# Patient Record
Sex: FEMALE | Race: BLACK OR AFRICAN AMERICAN | NOT HISPANIC OR LATINO | URBAN - METROPOLITAN AREA
[De-identification: names, ages, dates, MRNs, and addresses within clinical notes are randomized per-mention and may not be internally consistent; named-entity substitution may affect disease eponyms.]

---

## 2023-06-13 PROBLEM — E55.9 VITAMIN D DEFICIENCY: Status: ACTIVE | Noted: 2023-06-13

## 2023-06-13 PROBLEM — I10 HYPERTENSION: Status: ACTIVE | Noted: 2023-06-13

## 2023-06-13 PROBLEM — M15.9 GENERALIZED OSTEOARTHRITIS OF MULTIPLE SITES: Status: ACTIVE | Noted: 2023-06-13

## 2023-06-14 ENCOUNTER — OFFICE VISIT (OUTPATIENT)
Dept: PRIMARY CARE | Facility: CLINIC | Age: 63
End: 2023-06-14
Payer: COMMERCIAL

## 2023-06-14 VITALS
BODY MASS INDEX: 26.66 KG/M2 | WEIGHT: 136.5 LBS | HEART RATE: 92 BPM | DIASTOLIC BLOOD PRESSURE: 77 MMHG | SYSTOLIC BLOOD PRESSURE: 185 MMHG | TEMPERATURE: 98.3 F

## 2023-06-14 DIAGNOSIS — M25.562 CHRONIC PAIN OF LEFT KNEE: ICD-10-CM

## 2023-06-14 DIAGNOSIS — G43.709 CHRONIC MIGRAINE WITHOUT AURA WITHOUT STATUS MIGRAINOSUS, NOT INTRACTABLE: ICD-10-CM

## 2023-06-14 DIAGNOSIS — M25.512 CHRONIC LEFT SHOULDER PAIN: ICD-10-CM

## 2023-06-14 DIAGNOSIS — Z00.00 HEALTHCARE MAINTENANCE: ICD-10-CM

## 2023-06-14 DIAGNOSIS — E78.5 HYPERLIPIDEMIA, UNSPECIFIED HYPERLIPIDEMIA TYPE: ICD-10-CM

## 2023-06-14 DIAGNOSIS — I10 HYPERTENSION, UNSPECIFIED TYPE: ICD-10-CM

## 2023-06-14 DIAGNOSIS — Z12.39 BREAST SCREENING: Primary | ICD-10-CM

## 2023-06-14 DIAGNOSIS — M15.9 GENERALIZED OSTEOARTHRITIS OF MULTIPLE SITES: ICD-10-CM

## 2023-06-14 DIAGNOSIS — G89.29 CHRONIC PAIN OF LEFT KNEE: ICD-10-CM

## 2023-06-14 DIAGNOSIS — G89.29 CHRONIC LEFT SHOULDER PAIN: ICD-10-CM

## 2023-06-14 DIAGNOSIS — Z98.84 HISTORY OF ROUX-EN-Y GASTRIC BYPASS: ICD-10-CM

## 2023-06-14 PROCEDURE — 1036F TOBACCO NON-USER: CPT | Performed by: INTERNAL MEDICINE

## 2023-06-14 PROCEDURE — 3078F DIAST BP <80 MM HG: CPT | Performed by: INTERNAL MEDICINE

## 2023-06-14 PROCEDURE — 99215 OFFICE O/P EST HI 40 MIN: CPT | Performed by: INTERNAL MEDICINE

## 2023-06-14 PROCEDURE — 3077F SYST BP >= 140 MM HG: CPT | Performed by: INTERNAL MEDICINE

## 2023-06-14 RX ORDER — ATORVASTATIN CALCIUM 20 MG/1
1 TABLET, FILM COATED ORAL DAILY
COMMUNITY
Start: 2014-04-04 | End: 2023-06-14 | Stop reason: SDUPTHER

## 2023-06-14 RX ORDER — MECLIZINE HYDROCHLORIDE 25 MG/1
1 TABLET ORAL 3 TIMES DAILY PRN
COMMUNITY
Start: 2017-06-30 | End: 2023-06-14 | Stop reason: WASHOUT

## 2023-06-14 RX ORDER — ONDANSETRON 4 MG/1
TABLET, FILM COATED ORAL
COMMUNITY
Start: 2021-07-21 | End: 2023-06-14 | Stop reason: WASHOUT

## 2023-06-14 RX ORDER — BUTALBITAL, ACETAMINOPHEN AND CAFFEINE 50; 325; 40 MG/1; MG/1; MG/1
TABLET ORAL
COMMUNITY
Start: 2022-02-10 | End: 2023-06-14 | Stop reason: WASHOUT

## 2023-06-14 RX ORDER — ELETRIPTAN HYDROBROMIDE 40 MG/1
TABLET, FILM COATED ORAL
COMMUNITY
Start: 2014-12-17 | End: 2023-06-14 | Stop reason: WASHOUT

## 2023-06-14 RX ORDER — METOPROLOL TARTRATE 50 MG/1
1 TABLET ORAL 2 TIMES DAILY
COMMUNITY
Start: 2021-07-29 | End: 2023-06-14 | Stop reason: ALTCHOICE

## 2023-06-14 RX ORDER — DULOXETIN HYDROCHLORIDE 30 MG/1
1 CAPSULE, DELAYED RELEASE ORAL DAILY
COMMUNITY
Start: 2014-12-17 | End: 2023-06-14 | Stop reason: ALTCHOICE

## 2023-06-14 RX ORDER — MOMETASONE FUROATE 50 UG/1
SPRAY, METERED NASAL
COMMUNITY
Start: 2014-04-04

## 2023-06-14 RX ORDER — ATORVASTATIN CALCIUM 20 MG/1
20 TABLET, FILM COATED ORAL DAILY
Qty: 90 TABLET | Refills: 0 | Status: SHIPPED | OUTPATIENT
Start: 2023-06-14

## 2023-06-14 RX ORDER — BUTALBITAL, ACETAMINOPHEN AND CAFFEINE 50; 325; 40 MG/1; MG/1; MG/1
1 TABLET ORAL EVERY 4 HOURS PRN
Qty: 30 TABLET | Refills: 0 | Status: SHIPPED | OUTPATIENT
Start: 2023-06-14

## 2023-06-14 RX ORDER — KETOCONAZOLE 20 MG/G
CREAM TOPICAL 2 TIMES DAILY
COMMUNITY
Start: 2018-01-30 | End: 2023-06-14 | Stop reason: WASHOUT

## 2023-06-14 NOTE — ASSESSMENT & PLAN NOTE
Blood pressure elevated today reports within normal limits at home. Advised home blood pressure monitoring, will further risk stratify, consider EKG at next visit.

## 2023-06-14 NOTE — PATIENT INSTRUCTIONS
It was a pleasure to see you today! Here is a list of things we have discussed and to follow up on:   Gynecology referral - I recommend Dr. Rosibel Ward ([phone]), or Dr. Joslyn Contreras ([phone]).   Mammogram - Call [phone] or stop by the 4th floor ([address]) at the breast center to have this scheduled.   I have ordered blood and/or urine tests for you to do today. The lab can be found on this floor (2nd floor) next to the pharmacy across from the elevators.   I recommend you monitor your home blood pressure readings. To do this, be sure that the blood pressure cuff is on bare skin. Feet should be planted on the floor and back should be supported. Arm should be resting at the level of the heart. No talking to anyone during measurement and no caffeine within 30 minutes of checking blood pressure. Goal should be <130/80 on AVERAGE (outliers do not count).   Referrals to physical therapy for your knee and shoulder.   Followup 3 months with labs prior for your physical

## 2023-06-14 NOTE — PROGRESS NOTES
Subjective   Patient ID: Juan Miguel Cristobal is a 63 y.o. female who presents for NEW PT VISIT .  HPI  53-year-old female former patient of Dr. Johnson here for establishment of care, last seen 8/21     - A few months ago she fell 4/7 and hit her headache with subsequent severe headache that eventually resolved over the course of a month. She was at a show and someone fell into her and she fell backwards. then fell again with a headache 3 weeks later after she sat in a chair with a forgotten broken leg at the end of April and again hit her head with another headache that eventually subsided. She did not seek medical attention at the time. Denies LOC, seizure like activity, no incontinence, no changes in vision or hearing, no numbness or tingling, no weakness, no recurrence. Has not fallen since then.     PMHx:   -Hypertension- has been noting lately that her blood pressure readings were in the 140s-150s/80s at work at home is in the 110s/70s. Has common WCH. On no medications, previously treated prior to surgery. Previously on losartan and metoprolol.   - Fibromyalgia previously on duloxetine 30mg discontinued post surgery now resolved.   -Migraine headaches -consideration for Nurtec no aura, has not had any recently. She takes butalbital had intolerance to triptan   -Obesity status with  Ciro-en-Y 7/21/21   - Vitamin D deficiency   - OA in left knee and left shoulder - seen by orthopedics in the past recommended consideration for surgery recommended PT but never went due to COVID. After weight loss surgery all pain resolved in her knee until she ate something that has preservatives. Still with pain in left shoulder but also improved. Interested in PT to help with appropriate posture.   - HLD previously on atorvastatin 20mg then cut back to 10mg   - Partial thyroidectomy     Family: father's side lots of heart disease, diabetes, strokes     Social:   - Lives at home  and 25 year old son also with mother with  dementia, also daughter living with them   - Never tobacco, social alcohol   - Works as a pharmacist at [employer]   Current Outpatient Medications   Medication Instructions    atorvastatin (LIPITOR) 20 mg, oral, Daily    butalbital-acetaminophen-caff -40 mg tablet 1 tablet, oral, Every 4 hours PRN    mometasone (Nasonex) 50 mcg/actuation nasal spray nasal        Objective     BP (!) 185/77   Pulse 92   Temp 36.8 °C (98.3 °F)   Wt 61.9 kg (136 lb 8 oz)   BMI 26.66 kg/m²     Physical Exam  General: Appears comfortable, NAD, appropriate affect  HEENT: NCAT, EOMI, pupils symmetric, no conjunctival erythema   Neck: Supple, no LAD   Heart: RRR S1 S2 no murmurs appreciated   Lungs: CTA bilaterally, no rhonchi, rales, or wheezes   Abdomen: Soft, NT/ND, no rebound or guarding, NABS   Extremities: no cyanosis or edema appreciated, Knee Full ROM, no effusion or erythema appreciated, no joint line tenderness, normal patellar motion, negative anterior and posterior drawer, negative varus and valgus stress testing. Negative Lilia's sign.   Neuro: AAO x 3, answers questions appropriately, no FND, gait unremarkable    Assessment/Plan   Problem List Items Addressed This Visit       Hypertension     Blood pressure elevated today reports within normal limits at home. Advised home blood pressure monitoring, will further risk stratify, consider EKG at next visit.         Relevant Orders    Urinalysis with Reflex Microscopic    Generalized osteoarthritis of multiple sites     With chronic pain in left knee and shoulder. No alarm features appreciated on examination, will refer to PT.          History of Ciro-en-Y gastric bypass     With significant success at weight loss and significant improvement in chronic sequelae including pain. Has had nutritional deficiency in the past, currently taking a bariatric vitamin. Will screen for all nutritional deficiencies.         Relevant Orders    Ferritin    Iron and TIBC    Vitamin B12     Folate    Selenium, Blood    Vitamin A    Vitamin E    Vitamin K    Vitamin B1, Whole Blood    Copper, serum    Zinc    Chronic migraine without aura without status migrainosus, not intractable     Longstanding with improvement in symptoms on butalbital, not interested in adjustment or switching to alternate medication at present, not taking anything else. Limited refill provided.          Relevant Medications    butalbital-acetaminophen-caff -40 mg tablet    Hyperlipidemia     On atorvastatin 20mg due for rechecking lipid profile          Relevant Medications    atorvastatin (Lipitor) 20 mg tablet     Other Visit Diagnoses       Breast screening    -  Primary    Relevant Orders    BI mammo bilateral screening tomosynthesis    Chronic left shoulder pain        Relevant Orders    Referral to Physical Therapy    Healthcare maintenance        Relevant Orders    CBC and Auto Differential    Comprehensive Metabolic Panel    Hemoglobin A1C    Lipid Panel    TSH with reflex to Free T4 if abnormal    Vitamin D, Total    Hepatitis C Antibody    Follow Up In [facility] - PCP    Chronic pain of left knee        Relevant Orders    Referral to Physical Therapy          Health Maintenance  Cancer Screening:  - Colonoscopy 2021 at [facility]   - Mammogram due and ordered   - Pap due   - Skin   Immunizations: to discuss at next visit     Followup in 3 months with labs prior for preventive care visit.

## 2023-06-15 NOTE — ASSESSMENT & PLAN NOTE
Longstanding with improvement in symptoms on butalbital, not interested in adjustment or switching to alternate medication at present, not taking anything else. Limited refill provided.

## 2023-06-15 NOTE — ASSESSMENT & PLAN NOTE
With chronic pain in left knee and shoulder. No alarm features appreciated on examination, will refer to PT.

## 2023-06-15 NOTE — ASSESSMENT & PLAN NOTE
With significant success at weight loss and significant improvement in chronic sequelae including pain. Has had nutritional deficiency in the past, currently taking a bariatric vitamin. Will screen for all nutritional deficiencies.

## 2023-06-19 DIAGNOSIS — R92.8 ABNORMALITY OF RIGHT BREAST ON SCREENING MAMMOGRAM: Primary | ICD-10-CM

## 2023-09-20 ENCOUNTER — LAB (OUTPATIENT)
Dept: LAB | Facility: LAB | Age: 63
End: 2023-09-20
Payer: COMMERCIAL

## 2023-09-20 DIAGNOSIS — Z98.84 HISTORY OF ROUX-EN-Y GASTRIC BYPASS: ICD-10-CM

## 2023-09-20 DIAGNOSIS — Z00.00 HEALTHCARE MAINTENANCE: ICD-10-CM

## 2023-09-20 DIAGNOSIS — I10 HYPERTENSION, UNSPECIFIED TYPE: ICD-10-CM

## 2023-09-20 LAB
ALANINE AMINOTRANSFERASE (SGPT) (U/L) IN SER/PLAS: 28 U/L (ref 7–45)
ALBUMIN (G/DL) IN SER/PLAS: 4.8 G/DL (ref 3.4–5)
ALKALINE PHOSPHATASE (U/L) IN SER/PLAS: 45 U/L (ref 33–136)
ANION GAP IN SER/PLAS: 13 MMOL/L (ref 10–20)
APPEARANCE, URINE: NORMAL
ASPARTATE AMINOTRANSFERASE (SGOT) (U/L) IN SER/PLAS: 28 U/L (ref 9–39)
BASOPHILS (10*3/UL) IN BLOOD BY AUTOMATED COUNT: 0.05 X10E9/L (ref 0–0.1)
BASOPHILS/100 LEUKOCYTES IN BLOOD BY AUTOMATED COUNT: 0.7 % (ref 0–2)
BILIRUBIN TOTAL (MG/DL) IN SER/PLAS: 1.2 MG/DL (ref 0–1.2)
BILIRUBIN, URINE: NEGATIVE
BLOOD, URINE: NEGATIVE
CALCIDIOL (25 OH VITAMIN D3) (NG/ML) IN SER/PLAS: 63 NG/ML
CALCIUM (MG/DL) IN SER/PLAS: 10.6 MG/DL (ref 8.6–10.6)
CARBON DIOXIDE, TOTAL (MMOL/L) IN SER/PLAS: 29 MMOL/L (ref 21–32)
CHLORIDE (MMOL/L) IN SER/PLAS: 100 MMOL/L (ref 98–107)
CHOLESTEROL (MG/DL) IN SER/PLAS: 158 MG/DL (ref 0–199)
CHOLESTEROL IN HDL (MG/DL) IN SER/PLAS: 66.6 MG/DL
CHOLESTEROL/HDL RATIO: 2.4
COBALAMIN (VITAMIN B12) (PG/ML) IN SER/PLAS: 1314 PG/ML (ref 211–911)
COLOR, URINE: YELLOW
CREATININE (MG/DL) IN SER/PLAS: 0.92 MG/DL (ref 0.5–1.05)
EOSINOPHILS (10*3/UL) IN BLOOD BY AUTOMATED COUNT: 0.08 X10E9/L (ref 0–0.7)
EOSINOPHILS/100 LEUKOCYTES IN BLOOD BY AUTOMATED COUNT: 1.2 % (ref 0–6)
ERYTHROCYTE DISTRIBUTION WIDTH (RATIO) BY AUTOMATED COUNT: 15.8 % (ref 11.5–14.5)
ERYTHROCYTE MEAN CORPUSCULAR HEMOGLOBIN CONCENTRATION (G/DL) BY AUTOMATED: 30.9 G/DL (ref 32–36)
ERYTHROCYTE MEAN CORPUSCULAR VOLUME (FL) BY AUTOMATED COUNT: 78 FL (ref 80–100)
ERYTHROCYTES (10*6/UL) IN BLOOD BY AUTOMATED COUNT: 5.08 X10E12/L (ref 4–5.2)
ESTIMATED AVERAGE GLUCOSE FOR HBA1C: 97 MG/DL
FERRITIN (UG/LL) IN SER/PLAS: 208 UG/L (ref 8–150)
FOLATE (NG/ML) IN SER/PLAS: >24 NG/ML
GFR FEMALE: 70 ML/MIN/1.73M2
GLUCOSE (MG/DL) IN SER/PLAS: 84 MG/DL (ref 74–99)
GLUCOSE, URINE: NEGATIVE MG/DL
HEMATOCRIT (%) IN BLOOD BY AUTOMATED COUNT: 39.5 % (ref 36–46)
HEMOGLOBIN (G/DL) IN BLOOD: 12.2 G/DL (ref 12–16)
HEMOGLOBIN A1C/HEMOGLOBIN TOTAL IN BLOOD: 5 %
HEPATITIS C VIRUS AB PRESENCE IN SERUM: NONREACTIVE
IMMATURE GRANULOCYTES/100 LEUKOCYTES IN BLOOD BY AUTOMATED COUNT: 0.1 % (ref 0–0.9)
IRON (UG/DL) IN SER/PLAS: 125 UG/DL (ref 35–150)
IRON BINDING CAPACITY (UG/DL) IN SER/PLAS: 358 UG/DL (ref 240–445)
IRON SATURATION (%) IN SER/PLAS: 35 % (ref 25–45)
KETONES, URINE: NEGATIVE MG/DL
LDL: 75 MG/DL (ref 0–99)
LEUKOCYTE ESTERASE, URINE: NEGATIVE
LEUKOCYTES (10*3/UL) IN BLOOD BY AUTOMATED COUNT: 6.8 X10E9/L (ref 4.4–11.3)
LYMPHOCYTES (10*3/UL) IN BLOOD BY AUTOMATED COUNT: 3.92 X10E9/L (ref 1.2–4.8)
LYMPHOCYTES/100 LEUKOCYTES IN BLOOD BY AUTOMATED COUNT: 57.4 % (ref 13–44)
MONOCYTES (10*3/UL) IN BLOOD BY AUTOMATED COUNT: 0.49 X10E9/L (ref 0.1–1)
MONOCYTES/100 LEUKOCYTES IN BLOOD BY AUTOMATED COUNT: 7.2 % (ref 2–10)
NEUTROPHILS (10*3/UL) IN BLOOD BY AUTOMATED COUNT: 2.28 X10E9/L (ref 1.2–7.7)
NEUTROPHILS/100 LEUKOCYTES IN BLOOD BY AUTOMATED COUNT: 33.4 % (ref 40–80)
NITRITE, URINE: NEGATIVE
NRBC (PER 100 WBCS) BY AUTOMATED COUNT: 0 /100 WBC (ref 0–0)
PH, URINE: 8 (ref 5–8)
PLATELETS (10*3/UL) IN BLOOD AUTOMATED COUNT: 200 X10E9/L (ref 150–450)
POTASSIUM (MMOL/L) IN SER/PLAS: 3.8 MMOL/L (ref 3.5–5.3)
PROTEIN TOTAL: 7.3 G/DL (ref 6.4–8.2)
PROTEIN, URINE: NEGATIVE MG/DL
SODIUM (MMOL/L) IN SER/PLAS: 138 MMOL/L (ref 136–145)
SPECIFIC GRAVITY, URINE: 1 (ref 1–1.03)
THYROTROPIN (MIU/L) IN SER/PLAS BY DETECTION LIMIT <= 0.05 MIU/L: 1.24 MIU/L (ref 0.44–3.98)
TRIGLYCERIDE (MG/DL) IN SER/PLAS: 84 MG/DL (ref 0–149)
UREA NITROGEN (MG/DL) IN SER/PLAS: 18 MG/DL (ref 6–23)
UROBILINOGEN, URINE: <2 MG/DL (ref 0–1.9)
VLDL: 17 MG/DL (ref 0–40)

## 2023-09-20 PROCEDURE — 85025 COMPLETE CBC W/AUTO DIFF WBC: CPT

## 2023-09-20 PROCEDURE — 84446 ASSAY OF VITAMIN E: CPT

## 2023-09-20 PROCEDURE — 83550 IRON BINDING TEST: CPT

## 2023-09-20 PROCEDURE — 82746 ASSAY OF FOLIC ACID SERUM: CPT

## 2023-09-20 PROCEDURE — 84443 ASSAY THYROID STIM HORMONE: CPT

## 2023-09-20 PROCEDURE — 84255 ASSAY OF SELENIUM: CPT

## 2023-09-20 PROCEDURE — 84590 ASSAY OF VITAMIN A: CPT

## 2023-09-20 PROCEDURE — 83036 HEMOGLOBIN GLYCOSYLATED A1C: CPT

## 2023-09-20 PROCEDURE — 82607 VITAMIN B-12: CPT

## 2023-09-20 PROCEDURE — 36415 COLL VENOUS BLD VENIPUNCTURE: CPT

## 2023-09-20 PROCEDURE — 82525 ASSAY OF COPPER: CPT

## 2023-09-20 PROCEDURE — 84425 ASSAY OF VITAMIN B-1: CPT

## 2023-09-20 PROCEDURE — 80061 LIPID PANEL: CPT

## 2023-09-20 PROCEDURE — 86803 HEPATITIS C AB TEST: CPT

## 2023-09-20 PROCEDURE — 83540 ASSAY OF IRON: CPT

## 2023-09-20 PROCEDURE — 81003 URINALYSIS AUTO W/O SCOPE: CPT

## 2023-09-20 PROCEDURE — 84597 ASSAY OF VITAMIN K: CPT

## 2023-09-20 PROCEDURE — 82728 ASSAY OF FERRITIN: CPT

## 2023-09-20 PROCEDURE — 82306 VITAMIN D 25 HYDROXY: CPT

## 2023-09-20 PROCEDURE — 80053 COMPREHEN METABOLIC PANEL: CPT

## 2023-09-20 PROCEDURE — 84630 ASSAY OF ZINC: CPT

## 2023-09-21 ENCOUNTER — OFFICE VISIT (OUTPATIENT)
Dept: PRIMARY CARE | Facility: CLINIC | Age: 63
End: 2023-09-21
Payer: COMMERCIAL

## 2023-09-21 VITALS
DIASTOLIC BLOOD PRESSURE: 75 MMHG | BODY MASS INDEX: 26.63 KG/M2 | HEART RATE: 73 BPM | SYSTOLIC BLOOD PRESSURE: 160 MMHG | WEIGHT: 136.38 LBS | TEMPERATURE: 98.5 F

## 2023-09-21 DIAGNOSIS — I10 ESSENTIAL HYPERTENSION: ICD-10-CM

## 2023-09-21 DIAGNOSIS — I10 WHITE COAT SYNDROME WITH DIAGNOSIS OF HYPERTENSION: ICD-10-CM

## 2023-09-21 DIAGNOSIS — M15.9 GENERALIZED OSTEOARTHRITIS OF MULTIPLE SITES: ICD-10-CM

## 2023-09-21 DIAGNOSIS — M17.12 LOCALIZED OSTEOARTHRITIS OF LEFT KNEE: ICD-10-CM

## 2023-09-21 DIAGNOSIS — M25.512 CHRONIC LEFT SHOULDER PAIN: Primary | ICD-10-CM

## 2023-09-21 DIAGNOSIS — K46.9 ABDOMINAL HERNIA WITHOUT OBSTRUCTION AND WITHOUT GANGRENE, RECURRENCE NOT SPECIFIED, UNSPECIFIED HERNIA TYPE: ICD-10-CM

## 2023-09-21 DIAGNOSIS — Z23 IMMUNIZATION DUE: ICD-10-CM

## 2023-09-21 DIAGNOSIS — G89.29 CHRONIC LEFT SHOULDER PAIN: Primary | ICD-10-CM

## 2023-09-21 PROCEDURE — 99396 PREV VISIT EST AGE 40-64: CPT | Performed by: INTERNAL MEDICINE

## 2023-09-21 PROCEDURE — 90471 IMMUNIZATION ADMIN: CPT | Performed by: INTERNAL MEDICINE

## 2023-09-21 PROCEDURE — 3077F SYST BP >= 140 MM HG: CPT | Performed by: INTERNAL MEDICINE

## 2023-09-21 PROCEDURE — 90686 IIV4 VACC NO PRSV 0.5 ML IM: CPT | Performed by: INTERNAL MEDICINE

## 2023-09-21 PROCEDURE — 3078F DIAST BP <80 MM HG: CPT | Performed by: INTERNAL MEDICINE

## 2023-09-21 PROCEDURE — 1036F TOBACCO NON-USER: CPT | Performed by: INTERNAL MEDICINE

## 2023-09-21 RX ORDER — NICOTINE POLACRILEX 2 MG
GUM BUCCAL
COMMUNITY

## 2023-09-21 RX ORDER — CETIRIZINE HYDROCHLORIDE 10 MG/1
TABLET, CHEWABLE ORAL DAILY
COMMUNITY

## 2023-09-21 ASSESSMENT — PATIENT HEALTH QUESTIONNAIRE - PHQ9
1. LITTLE INTEREST OR PLEASURE IN DOING THINGS: NOT AT ALL
SUM OF ALL RESPONSES TO PHQ9 QUESTIONS 1 & 2: 0
2. FEELING DOWN, DEPRESSED OR HOPELESS: NOT AT ALL

## 2023-09-21 NOTE — ASSESSMENT & PLAN NOTE
Seen by PT which has helped, has been working with ,   Needs additional referral for chronic shoulder pain.

## 2023-09-21 NOTE — PATIENT INSTRUCTIONS
It was a pleasure to see you today! Here is a list of things we have discussed and to follow up on:    Hernia - I have referred you to surgery to have it managed.   Blood pressure - 24 hour ambulatory blood pressure monitor. Call the cardiology department to have this scheduled.   Take iron every OTHER day, 400mg 30 minutes to possibly enhance its aborption   Referral to surgery for hernia   Continue physical therapy.   COVID booster at the pharmacy.   Followup 6 months

## 2023-09-21 NOTE — PROGRESS NOTES
Subjective   Patient ID: Juan Miguel Cristobal is a 63 y.o. female who presents for Follow-up.  HPI    63F here for followup visit, last seen for establishment of care.  6/23; getting right breast mammo, has had repeat breast imaging awaiting official results though likely normal.   labs performed prior to today's visit.  - planning on traveling to [place] in a few weeks, concerned regarding walking a lot and uneven ground.   - Known hernia above her bellybutton, has had intermittent symptoms resolved after laying flat.     PMHx:   -HTN- with possible component of whitecoat hypertension recommended home blood pressure monitoring, has not been measuring home blood pressure readings consistently. Was in good control when measured today.   - Fibromyalgia previously on duloxetine 30mg discontinued post surgery now resolved.   -Migraine headaches -consideration for Nurtec no aura, has not had any recently. She takes butalbital had intolerance to triptan was not interested in switching to alternate medication  -Obesity status with  Ciro-en-Y 7/21/21-ordered for nutritional deficiency status at last visit  - Vitamin D deficiency   - OA in left knee and left shoulder -chronic pain in left knee and shoulder referred to PT at last visit was recommended replacement surgery in the past by orthopedics. Seen by PT to address the knee, given some ideas on ways to improve symptoms.   - HLD on atorvastatin 20 mg  - Partial thyroidectomy      Family: father's side lots of heart disease, diabetes, strokes      Social:   - Lives at home  and 25 year old son also with mother with dementia, also daughter living with them   - Never tobacco, social alcohol   - Works as a pharmacist at [employer]     Lifestyle   - Diet - eats overall well, lots of salads  - Exercise -  once a week and walks two miles twice a week, trying to get into weight lifting exercises as recommended by physical therapist   - Sleep - works  nightshift 7 on and 7 off which limits her sleep.     Current Outpatient Medications   Medication Instructions    atorvastatin (LIPITOR) 20 mg, oral, Daily    biotin 1 mg capsule oral    butalbital-acetaminophen-caff -40 mg tablet 1 tablet, oral, Every 4 hours PRN    cetirizine (ZyrTEC) 10 mg chewable tablet oral, Daily    mometasone (Nasonex) 50 mcg/actuation nasal spray nasal        Objective     /75   Pulse 73   Temp 36.9 °C (98.5 °F)   Wt 61.9 kg (136 lb 6 oz)   BMI 26.63 kg/m²     Physical Exam  General: Awake, alert, appears stated age   Head/eyes/ears: NCAT, EOMI, PERRL, TM WNL, no cerumen  Throat: moist mucus membranes, no pharyngeal erythema  Neck: Supple, nontender, no lymphadenopathy, thyroid exam unremarkable   Breast exam: No palpable lumps, no discharge, no noted axillary lymphadenopathy, offered and declined  Heart: RRR, no murmurs, rubs or gallops  Lungs: CTA bilaterally, no rhonchi rales or wheezes   Abdomen: Soft, NT/ND  Extremities: No edema, 2+ DP pulses   Skin: No concerning skin lesions on visualized skin   Neuro: AAO x 3, no FND, gait unremarkable     Assessment/Plan   Problem List Items Addressed This Visit      Adult health exam  Age-appropriate screening form  Depression screen negative  No additional pertinent family history or toxic habits  No high risk sexual behavior, declines STI screening  Cancer screening:  -Colonoscopy 2021 at [facility] repeat 10 years   -Mammogram 6/23  -Pap smear due and will schedule.   - Skin - no concerns   Immunizations: Flu shot today, Tdap and subsequent vision, due for COVID booster, discussed consideration for RSV vaccine     Generalized osteoarthritis of multiple sites     Seen by PT which has helped, has been working with ,   Needs additional referral for chronic shoulder pain.         Localized osteoarthritis of left knee  Followed by PT with improvement in symptoms recommended knee brace    Relevant Orders    Knee Brace,  Hinged Short     Other Visit Diagnoses       Chronic left shoulder pain    -  Primary      Relevant Orders    Referral to Physical Therapy        White coat syndrome with diagnosis of hypertension    with  With inconsistent blood pressure readings, unable to fully assess if blood pressure readings are controlled at home.  There is a component of whitecoat hypertension, will obtain 24-hour ambulatory blood pressure monitor to ensure these readings are well controlled    Relevant Orders    24 Hour Blood Pressure Monitor    Abdominal hernia without obstruction and without gangrene, recurrence not specified, unspecified hernia type      Intermittently symptomatic, will refer to general or bariatric surgery    Relevant Orders    Referral to General Surgery    Immunization due        Relevant Orders    Flu vaccine (IIV4) age 6 months and greater, preservative free (Completed)     Post bariatric surgery  Maintained on bariatric multivitamin, iron levels borderline elevated, discussed reducing to every other day, can take vitamin C to enhance its absorption.  Awaiting additional nutritional deficiency studies via blood work.  We will continue to monitor iron levels.       Followup 6 months

## 2023-09-23 LAB
COPPER: 115 UG/DL (ref 80–155)
SELENIUM, SERUM/PLASMA: 103.2 UG/L (ref 23–190)
ZINC,SERUM OR PLASMA: 69.9 UG/DL (ref 60–120)

## 2023-09-25 LAB
VITAMIN A (RETINOL): 0.87 MG/L (ref 0.3–1.2)
VITAMIN A (RETINYL PALMITATE): 0.03 MG/L (ref 0–0.1)
VITAMIN A, INTERPRETATION: NORMAL
VITAMIN B1, WHOLE BLOOD: 189 NMOL/L (ref 70–180)
VITAMIN E (ALPHA-TOCOPHEROL): 10.6 MG/L (ref 5.5–18)
VITAMIN E (GAMMA-TOCOPHEROL): 0.4 MG/L (ref 0–6)

## 2023-09-26 LAB — VITAMIN K: 1.31 NMOL/L (ref 0.22–4.88)

## 2023-09-30 PROBLEM — K21.9 GERD (GASTROESOPHAGEAL REFLUX DISEASE): Status: ACTIVE | Noted: 2021-07-21

## 2023-09-30 PROBLEM — H01.009 BLEPHARITIS: Status: ACTIVE | Noted: 2023-09-30

## 2023-09-30 PROBLEM — K76.0 HEPATIC STEATOSIS: Status: ACTIVE | Noted: 2021-07-21

## 2023-09-30 PROBLEM — H91.90 HEARING LOSS: Status: ACTIVE | Noted: 2023-09-30

## 2023-09-30 PROBLEM — L29.9 ITCHING: Status: ACTIVE | Noted: 2023-09-30

## 2023-09-30 PROBLEM — R53.83 FATIGUE: Status: ACTIVE | Noted: 2018-12-14

## 2023-09-30 PROBLEM — R07.89 ATYPICAL CHEST PAIN: Status: ACTIVE | Noted: 2023-09-30

## 2023-09-30 PROBLEM — R06.02 SOBOE (SHORTNESS OF BREATH ON EXERTION): Status: ACTIVE | Noted: 2018-12-14

## 2023-09-30 PROBLEM — K90.9 INTESTINAL MALABSORPTION (HHS-HCC): Status: ACTIVE | Noted: 2021-10-29

## 2023-09-30 PROBLEM — K29.30 CHRONIC SUPERFICIAL GASTRITIS WITHOUT BLEEDING: Status: ACTIVE | Noted: 2021-02-02

## 2023-09-30 PROBLEM — R40.0 DAYTIME SLEEPINESS: Status: ACTIVE | Noted: 2018-12-14

## 2023-09-30 PROBLEM — E66.01 MORBID OBESITY (MULTI): Status: ACTIVE | Noted: 2021-07-21

## 2023-09-30 PROBLEM — K44.9 HIATAL HERNIA: Status: ACTIVE | Noted: 2021-07-21

## 2023-09-30 PROBLEM — D64.9 ANEMIA: Status: ACTIVE | Noted: 2018-12-14

## 2023-09-30 PROBLEM — K22.2 SCHATZKI'S RING: Status: ACTIVE | Noted: 2021-07-21

## 2023-09-30 PROBLEM — G43.909 HEADACHE, MIGRAINE: Status: ACTIVE | Noted: 2023-09-30

## 2023-09-30 PROBLEM — E61.7 DEFICIENCY OF MULTIPLE NUTRIENT ELEMENTS: Status: ACTIVE | Noted: 2021-10-29

## 2023-09-30 PROBLEM — H81.10 BPV (BENIGN POSITIONAL VERTIGO): Status: ACTIVE | Noted: 2023-09-30

## 2023-10-01 NOTE — PROGRESS NOTES
Physical Therapy    Therapy Diagnosis  Assessed    · Left knee pain (719.46) (M25.562)   · Left shoulder pain (719.41) (M25.512)    Plan    Goals: Goals set and discussed today.     9/26/23:  Goals updated today to include left shoulder   Activity Limitation: Shoulder:  Will report being able to sleep on left side without waking due to pain; and report improved overhead reaching without pain., by week 6   Pain: Knee:Will report left knee pain no worse than 2/10 with prolonged walking/ standing; and daily activities.  Shoulder:  Left shoulder pain no worse than 2-3/10 with overhead reaching, overhead use, sleeping., by week 6   Range Of Motion/Joint Mobility: Left knee: painfree flexion improved to at least 120 deg.  Left shoulder: AROM WNL and no painful arc for improved overhead reaching., by week 6   Strength: Improved valgus control with 8 in step down and completing at leas 15 reps without difficulty, and 30 second chair rise test within age adjusted norm of 12 or better for improved functional strength.  Shoulder: Left UE strength at least 4+/5 and painfree with MMT for improved funciton with overhead reaching; lifting, ADLs., by week 6   HEP, Independent and compliant with appropriate HEP for carryover of PT to meet all goals.  , by week 6     Planned interventions include: education/instruction, home program, therapeutic activities and therapeutic exercises . Will focus on HEP development to complement current weekly workouts.   Frequency and duration: 1 time(s) a week, for 4-6 weeks.   Potential to achieve rehab goals is good      Work to adjunct/ modify current weekly  workouts as appropriate for LE functional strengthening, and painfree left UE strengthening.    Progress with POC, as tolerated.    Monitor home program.

## 2023-10-03 ENCOUNTER — APPOINTMENT (OUTPATIENT)
Dept: PHYSICAL THERAPY | Facility: CLINIC | Age: 63
End: 2023-10-03
Payer: COMMERCIAL

## 2023-10-25 ENCOUNTER — TREATMENT (OUTPATIENT)
Dept: PHYSICAL THERAPY | Facility: CLINIC | Age: 63
End: 2023-10-25
Payer: COMMERCIAL

## 2023-10-25 DIAGNOSIS — M25.562 LEFT KNEE PAIN: ICD-10-CM

## 2023-10-25 DIAGNOSIS — G89.29 CHRONIC LEFT SHOULDER PAIN: Primary | ICD-10-CM

## 2023-10-25 DIAGNOSIS — M25.512 CHRONIC LEFT SHOULDER PAIN: Primary | ICD-10-CM

## 2023-10-25 DIAGNOSIS — M25.512 LEFT SHOULDER PAIN: ICD-10-CM

## 2023-10-25 PROCEDURE — 97110 THERAPEUTIC EXERCISES: CPT | Mod: GP,CQ

## 2023-10-25 ASSESSMENT — PAIN DESCRIPTION - DESCRIPTORS: DESCRIPTORS: ACHING;TIGHTNESS;SHARP

## 2023-10-25 ASSESSMENT — PAIN SCALES - GENERAL: PAINLEVEL_OUTOF10: 2

## 2023-10-25 ASSESSMENT — PAIN - FUNCTIONAL ASSESSMENT: PAIN_FUNCTIONAL_ASSESSMENT: 0-10

## 2023-10-25 NOTE — PROGRESS NOTES
Physical Therapy    Physical Therapy Treatment    Patient Name: Juan Miguel Cristobal  MRN: [id number]  : 1960  Today's Date: 10/25/2023  Time Calculation  Start Time: 930  Stop Time: 0  Time Calculation (min): 50 min    Visit: 3  Visit limit: 30    Assessment:   Patient performed added exercises without complaints of increased left shoulder or left knee symptoms. Patient presented with increases in left shoulder range of motion measurements since last recorded measures. Patient indicates is able to ascend and descend steps with a reciprocating pattern typically without limitations.     Plan:   Continue with left shoulder and left knee range of motion and strengthening program progressing as tolerated to return patient to prior functional status.     Patient to be reassessed 2023.    Current Problem  1. Chronic left shoulder pain        2. Left knee pain  PT eval and treat      3. Left shoulder pain  PT eval and treat          Subjective   Patient reports continues to experience left shoulder range of motion limitations with reaching up back as well as cross body movements. Patient reports left knee pain has decreased with standing and walking activities.      Precautions  Precautions  Precautions Comment: None    Pain  Pain Assessment: 0-10  Pain Score: 2  Pain Location: Shoulder  Pain Orientation: Left  Pain Descriptors: Aching, Tightness, Sharp    Objective   Added exercises. See exercise log for specifics.  Progressed and issued written HEP. Issued blue theraband    AROM left shoulder  Flexion = 155 degrees with no complaints of painful arc  Abduction = 160 degrees with no complaints of painful arc  Internal rotation = T7 tightness at end range  Horizontal adduction to right anterior clavicle    Gait - no noticeable deviations noted with transfers or weight bearing activities.     Outcome Measures:  Other Measures  Lower Extremity Funtional Score (LEFS): 72/80 at initial  "evaluation    Treatments:    EXERCISES Date    10/25/2023 Date Date Date   NuStep L5 10 minutes             Shuttle DLP 6B 3 x 10      Shuttle DTR 6B 3 x 10      Shuttle SLP 4B 3 x 10             Bilateral hamstring curls 35# 3 x 10             Multi hip flexion  Next      Multi hip abduction Next      Closed chain TKE Next                                         Pulldowns Black x 20      Rows Black x 20      TB: Flexion, IR, ER, Adduction Black x 20 each      BTE ROM T-60 60\" each      BTE Push/Pull T-126 60\"      BTE H. Push/Pull T-126 60\"      BTE IR/ER T-33 60\"                                                              HEP           OP EDUCATION:   Access Code: [email or web address]  URL: [email or web address]  Date: 10/25/2023  Prepared by: Jordan Martin    Exercises  - Pulldowns  - 1-2 x daily - 7 x weekly - 1-2 sets - 20-30 reps  - Rows  - 1-2 x daily - 7 x weekly - 1-2 sets - 20-30 reps  - Standing Single Arm Shoulder Flexion with Posterior Anchored Resistance  - 1-2 x daily - 7 x weekly - 1-2 sets - 20-30 reps  - Shoulder External Rotation  - 1-2 x daily - 7 x weekly - 1-2 sets - 20-30 reps  - Shoulder Adduction  - 1-2 x daily - 7 x weekly - 1-2 sets - 20-30 reps  - Shoulder Internal Rotation  - 1-2 x daily - 7 x weekly - 1-2 sets - 20-30 reps    Goals:   9/26/23:  Goals updated today to include left shoulder   Activity Limitation: Shoulder:  Will report being able to sleep on left side without waking due to pain; and report improved overhead reaching without pain., by week 6     Pain: Knee:Will report left knee pain no worse than 2/10 with prolonged walking/ standing; and daily activities.    Shoulder:  Left shoulder pain no worse than 2-3/10 with overhead reaching, overhead use, sleeping., by week 6     Range Of Motion/Joint Mobility: Left knee: painfree flexion improved to at least 120 deg.  Left shoulder: AROM WNL and no painful arc for improved overhead reaching., by week 6    10/25/2023 " progressing     Strength: Improved valgus control with 8 in step down and completing at leas 15 reps without difficulty, and 30 second chair rise test within age adjusted norm of 12 or better for improved functional strength.    Shoulder: Left UE strength at least 4+/5 and painfree with MMT for improved funciton with overhead reaching; lifting, ADLs., by week 6     HEP, Independent and compliant with appropriate HEP for carryover of PT to meet all goals.  , by week 6     Planned interventions include: education/instruction, home program, therapeutic activities and therapeutic exercises . Will focus on HEP development to complement current weekly workouts.   Frequency and duration: 1 time(s) a week, for 4-6 weeks.   Potential to achieve rehab goals is good

## 2023-10-30 ENCOUNTER — TREATMENT (OUTPATIENT)
Dept: PHYSICAL THERAPY | Facility: CLINIC | Age: 63
End: 2023-10-30
Payer: COMMERCIAL

## 2023-10-30 DIAGNOSIS — M25.512 LEFT SHOULDER PAIN: ICD-10-CM

## 2023-10-30 DIAGNOSIS — G89.29 CHRONIC LEFT SHOULDER PAIN: Primary | ICD-10-CM

## 2023-10-30 DIAGNOSIS — M25.562 LEFT KNEE PAIN: ICD-10-CM

## 2023-10-30 DIAGNOSIS — M25.512 CHRONIC LEFT SHOULDER PAIN: Primary | ICD-10-CM

## 2023-10-30 PROCEDURE — 97110 THERAPEUTIC EXERCISES: CPT | Mod: GP,CQ

## 2023-10-30 ASSESSMENT — PAIN - FUNCTIONAL ASSESSMENT: PAIN_FUNCTIONAL_ASSESSMENT: 0-10

## 2023-10-30 ASSESSMENT — PAIN DESCRIPTION - DESCRIPTORS: DESCRIPTORS: ACHING;SHARP;TIGHTNESS

## 2023-10-30 NOTE — PROGRESS NOTES
Physical Therapy    Physical Therapy Treatment    Patient Name: Juan Miguel Cristobal  MRN: [id number]  : 1960  Today's Date: 10/30/2023  Time Calculation  Start Time: 0945  Stop Time: 5  Time Calculation (min): 50 min    Visit: 4    Visit limit: 30    Assessment:   Patient performed added exercises without complaints of increased left shoulder or left knee discomfort. Patient reports has been able to perform job responsibilities without significant limitation due to left knee or shoulder symptoms.     Plan:   Continue with left shoulder and left knee range of motion and strengthening program progressing as tolerated to return patient to prior functional status.     Patient to be reassessed 2023.    Current Problem  1. Chronic left shoulder pain        2. Left knee pain  PT eval and treat      3. Left shoulder pain  PT eval and treat          Subjective   Patient reports left shoulder continues to become sore with laying on left side. Patient reports has noticed is able to lay on left side for longer periods before pain begins to present.      Precautions  Precautions  Precautions Comment: None    Pain  Pain Assessment: 0-10  Pain Score:  (1-2/10)  Pain Location: Shoulder  Pain Orientation: Left  Pain Descriptors: Aching, Sharp, Tightness    Objective   Added and increased exercises. See exercise log for specifics.        AROM left shoulder  Flexion = 158 degrees     Outcome Measures:       Treatments:    EXERCISES Date    10/25/2023 Date    10/30/2023 Date Date   NuStep L5 10 minutes L5 10 minutes            Shuttle DLP 6B 3 x 10 6B 3 x 15     Shuttle DTR 6B 3 x 10 6B 3 x 15     Shuttle SLP 4B 3 x 10 4B 3 x 15            Bilateral hamstring curls 35# 3 x 10 35# 3  x 15            Multi hip flexion  Next 40# x 20     Multi hip abduction Next 40# x 20     Closed chain TKE Next 90# x 20                                        Pulldowns Black x 20 HEP     Rows Black x 20 HEP     TB: Flexion, IR, ER, Adduction  "Black x 20 each HEP     BTE ROM T-60 60\" each T-66 90\" each     BTE Push/Pull T-126 60\" T-135 90\"      BTE H. Push/Pull T-126 60\" T-135 90\"      BTE IR/ER T-33 60\" T-36 90\"      Flexion to 90  Next     Scaption to 90  Next     Abduction to 90  Next     Sidelying ER  2# 3 x 10     Serratus punches  4# 3 x 10                          HEP  Reviewed         OP EDUCATION:       Goals:   9/26/23:  Goals updated today to include left shoulder   Activity Limitation: Shoulder:  Will report being able to sleep on left side without waking due to pain; and report improved overhead reaching without pain., by week 6     Pain: Knee:Will report left knee pain no worse than 2/10 with prolonged walking/ standing; and daily activities.    Shoulder:  Left shoulder pain no worse than 2-3/10 with overhead reaching, overhead use, sleeping., by week 6     Range Of Motion/Joint Mobility: Left knee: painfree flexion improved to at least 120 deg.  Left shoulder: AROM WNL and no painful arc for improved overhead reaching., by week 6    10/30/2023 progressing     Strength: Improved valgus control with 8 in step down and completing at leas 15 reps without difficulty, and 30 second chair rise test within age adjusted norm of 12 or better for improved functional strength.    Shoulder: Left UE strength at least 4+/5 and painfree with MMT for improved funciton with overhead reaching; lifting, ADLs., by week 6     HEP, Independent and compliant with appropriate HEP for carryover of PT to meet all goals.  , by week 6     Planned interventions include: education/instruction, home program, therapeutic activities and therapeutic exercises . Will focus on HEP development to complement current weekly workouts.   Frequency and duration: 1 time(s) a week, for 4-6 weeks.   Potential to achieve rehab goals is good    "

## 2023-11-08 ENCOUNTER — TREATMENT (OUTPATIENT)
Dept: PHYSICAL THERAPY | Facility: CLINIC | Age: 63
End: 2023-11-08
Payer: COMMERCIAL

## 2023-11-08 DIAGNOSIS — G89.29 CHRONIC LEFT SHOULDER PAIN: ICD-10-CM

## 2023-11-08 DIAGNOSIS — M25.562 CHRONIC PAIN OF LEFT KNEE: ICD-10-CM

## 2023-11-08 DIAGNOSIS — M25.562 LEFT KNEE PAIN: ICD-10-CM

## 2023-11-08 DIAGNOSIS — M25.512 CHRONIC LEFT SHOULDER PAIN: ICD-10-CM

## 2023-11-08 DIAGNOSIS — M25.512 LEFT SHOULDER PAIN: Primary | ICD-10-CM

## 2023-11-08 DIAGNOSIS — G89.29 CHRONIC PAIN OF LEFT KNEE: ICD-10-CM

## 2023-11-08 PROCEDURE — 97110 THERAPEUTIC EXERCISES: CPT | Mod: GP

## 2023-11-08 PROCEDURE — 97530 THERAPEUTIC ACTIVITIES: CPT | Mod: GP

## 2023-11-08 ASSESSMENT — PAIN SCALES - GENERAL: PAINLEVEL_OUTOF10: 0 - NO PAIN

## 2023-11-08 ASSESSMENT — PAIN - FUNCTIONAL ASSESSMENT: PAIN_FUNCTIONAL_ASSESSMENT: 0-10

## 2023-11-08 NOTE — PROGRESS NOTES
Physical Therapy    Physical Therapy Treatment    Patient Name: Juan Miguel Cristobal  MRN: [id number]  Today's Date: 11/8/2023  Time Calculation  Start Time: 0955  Stop Time: 1040  Time Calculation (min): 45 min  5 total visits attended    Assessment:     Juan Miguel has attended 5 total PT visits to address her left shoulder and knee pain. Her knee is not limiting her at this time and she is not having pain in her knee with daily activities. Her left shoulder still presents with a painful arc of motion (slightly improved); weakness and pain with MMT with internal rotators and supraspinatus especially. Her function is still limited by shoulder pain as well. She will benefit from continued PT to focus on her left shoulder ROM, strengthening, and updated HEP to work to achieve less pain and improved function.   Plan:     Continue with a focus on her left shoulder ROM, subsymptom RTC/ scap stabilizer strengthening; and HEP progression to meet goals and improve painfree function.     Current Problem  1. Left shoulder pain  PT eval and treat    Follow Up In Physical Therapy      2. Left knee pain  PT eval and treat    Follow Up In Physical Therapy      3. Chronic left shoulder pain        4. Chronic pain of left knee            Subjective   General  General Comment: Resumed personal training session last week; it had been at least a month since her last session. She had left shoulder soreness for about 2-3 days after; but that has calmed down at this time. Has not been doing her HEP.  Not really having much knee pain at this time and for a few weeks. Left shoulder is still sore at times- turning the steering wheel; or rotating arm inward can still provoke pain.Reaching back for seat belt also aggravates her left shoulder.  Precautions  Precautions  Precautions Comment: None  Pain  Pain Assessment: 0-10  Pain Score: 0 - No pain  Pain Location:  (Left knee and shoulder)    Objective   Knee:  30 sec chair stand test: 11 reps  "(painfree)    ROM / Joint Mobility   (Range of Motion in degrees)   Knee:   (Key = \" P! \" Denotes Pain with Movement)   Extension: R Active 0 deg, L Active 0 deg.   Flexion: R Active 130 deg, L Active 120 deg and painfree   Strength   Hip:   (Key: \"P!\" Denotes Pain with Movement)   Flexion: 5/5 on right and 5/5 on left.   Abduction: 4+/5 on right and 4+/5 on left.   Adduction: 4+/5 on right and 4+/5 on left.   Knee:   (Key: \" P! \" Denotes Pain with Movement)   Knee extension was 4+/5 on right, 4+/5 on left.   Knee flexion was 4+/5 on right, 4+/5 on left.   Front step down on 8 in step: fair quad control on left and mild discomfort; good quad control on right and no pain.     Shoulder:   Palpation:  Left ant shoulder soreness in region of long head of bicep tendon; otherwise, non tender. Right shoulder is non tender.      Shoulder AROM:  Flexion: Left: 130 - 140 painful arc and goes into more scaption; Right : Painfree to 150 deg  Abduction: Left to 160 and painful arc above 120 deg; Right to 160 deg and painfree  IR/ER: 90 deg ea.   Arm behind back to about T7 bilaterally, painfree  Arm behind head to T2 bilaterally, painfree     UE strength:   flex: 4/ 5 left; 4+/5 right  abd: 4/5 P! left; 4+/5 right  IR: 4-/5 P! left; 4+/5 right  ER: 4+/5 left and right   Supraspinatus:  4-/5 left and painful  .   Ortho Special Tests   Shoulder Special Tests:   Painful Arc: Left shoulder  Belly Press Test: Left negative   Bear Hug Test: Left positive   Speed's: Left positive   Scarf Test - Horizontal Adduction Stress Test: Left positive      Treatments:     EXERCISES Date    10/25/2023 Date    10/30/2023 Date 11/8/23 Date   NuStep L5 10 minutes L5 10 minutes  L5 x 10 min           Shuttle DLP 6B 3 x 10 6B 3 x 15     Shuttle DTR 6B 3 x 10 6B 3 x 15     Shuttle SLP 4B 3 x 10 4B 3 x 15            Bilateral hamstring curls 35# 3 x 10 35# 3  x 15            Multi hip flexion  Next 40# x 20     Multi hip abduction Next 40# x 20   " "  Closed chain TKE Next 90# x 20            Front step up/down   8 in x 10    30 sec chair rise test   11 reps                  Pulldowns Black x 20 HEP     Rows Black x 20 HEP     TB: Flexion, IR, ER, Adduction Black x 20 each HEP     BTE ROM T-60 60\" each T-66 90\" each     BTE Push/Pull T-126 60\" T-135 90\"      BTE H. Push/Pull T-126 60\" T-135 90\"      BTE IR/ER T-33 60\" T-36 90\"      Flexion to 90  Next     Scaption to 90  Next     Abduction to 90  Next     Sidelying ER  2# 3 x 10     Serratus punches  4# 3 x 10                   REcheck    35 minutes    HEP  Reviewed Reviewed tband ex      OP EDUCATION:   Reviewed HEP for shoulder tband ex and encouraged adherence to HEP.    Goals: (as of 11/8/23)   Activity Limitation: Shoulder:  Will report being able to sleep on left side without waking due to pain; and report improved overhead reaching without pain., by week 6 - not met    Pain: Knee:Will report left knee pain no worse than 2/10 with prolonged walking/ standing; and daily activities.-met    Shoulder:  Left shoulder pain no worse than 2-3/10 with overhead reaching, overhead use, sleeping., by week 6 -not met    Range Of Motion/Joint Mobility: Left knee: painfree flexion improved to at least 120 deg.- met  Left shoulder: AROM WNL and no painful arc for improved overhead reaching., by week 6    -progressing    Strength: Improved valgus control with 8 in step down and completing at leas 15 reps without difficulty, and 30 second chair rise test within age adjusted norm of 12 or better for improved functional strength.- progressing    Shoulder: Left UE strength at least 4+/5 and painfree with MMT for improved funciton with overhead reaching; lifting, ADLs., by week 6 - not met    HEP, Independent and compliant with appropriate HEP for carryover of PT to meet all goals.  , by week 6 -not met    Planned interventions include: education/instruction, home program, therapeutic activities and therapeutic exercises . " Will focus on HEP development to complement current weekly workouts.   Frequency and duration: 1 time(s) a week, for 4 more weeks.  Potential to achieve rehab goals is good

## 2023-11-15 ENCOUNTER — TREATMENT (OUTPATIENT)
Dept: PHYSICAL THERAPY | Facility: CLINIC | Age: 63
End: 2023-11-15
Payer: COMMERCIAL

## 2023-11-15 DIAGNOSIS — M25.562 LEFT KNEE PAIN: ICD-10-CM

## 2023-11-15 DIAGNOSIS — G89.29 CHRONIC PAIN OF LEFT KNEE: Primary | ICD-10-CM

## 2023-11-15 DIAGNOSIS — G89.29 CHRONIC LEFT SHOULDER PAIN: ICD-10-CM

## 2023-11-15 DIAGNOSIS — M25.512 CHRONIC LEFT SHOULDER PAIN: ICD-10-CM

## 2023-11-15 DIAGNOSIS — M25.562 CHRONIC PAIN OF LEFT KNEE: Primary | ICD-10-CM

## 2023-11-15 DIAGNOSIS — M25.512 LEFT SHOULDER PAIN: ICD-10-CM

## 2023-11-15 PROCEDURE — 97110 THERAPEUTIC EXERCISES: CPT | Mod: GP

## 2023-11-15 ASSESSMENT — PAIN SCALES - GENERAL: PAINLEVEL_OUTOF10: 0 - NO PAIN

## 2023-11-15 ASSESSMENT — PAIN - FUNCTIONAL ASSESSMENT: PAIN_FUNCTIONAL_ASSESSMENT: 0-10

## 2023-11-15 NOTE — PROGRESS NOTES
Physical Therapy    Physical Therapy Treatment    Patient Name: Juan Miguel Cristobal  MRN: [id number]  Today's Date: 11/15/2023  Time Calculation  Start Time: 1100  Stop Time: 1145  Time Calculation (min): 45 min    Assessment:     We have decided to focus on Juan Miguel's left shoulder deficits. She tolerated AAROM and beginng scap stabilizer/ RTC strengthening in subsymptom ranges. She was given table AAROM to work into her day at work/ home. She has difficulty getting to her HEP.   Encouraged ex in painfree ranges.     Plan:   Assess response to today's shoulder exercises and progress as tolerated. Update HEP with strengthening exercises.     Current Problem  1. Chronic pain of left knee        2. Left knee pain  Follow Up In Physical Therapy      3. Left shoulder pain  Follow Up In Physical Therapy      4. Chronic left shoulder pain            Subjective   General  General Comment: Shoulder is still sore at times; may have slept wrong and had some elbow to forearm soreness this am; but not currently hurting.  Precautions  Precautions  Precautions Comment: None  Pain  Pain Assessment: 0-10  Pain Score: 0 - No pain (depends on activity level though; 8/10- does not persist)  Pain Location: Shoulder  Pain Orientation: Left    Treatments:     EXERCISES Date    10/25/2023 Date    10/30/2023 Date 11/8/23 Date 11/15/23   NuStep L5 10 minutes L5 10 minutes  L5 x 10 min           Shuttle DLP 6B 3 x 10 6B 3 x 15     Shuttle DTR 6B 3 x 10 6B 3 x 15     Shuttle SLP 4B 3 x 10 4B 3 x 15            Bilateral hamstring curls 35# 3 x 10 35# 3  x 15            Multi hip flexion  Next 40# x 20     Multi hip abduction Next 40# x 20     Closed chain TKE Next 90# x 20            Front step up/down   8 in x 10    30 sec chair rise test   11 reps    Pulley AAROM flexion     3 minutes   Table ER/ Scaption AAROM    15 x 5 sec each   Pulldowns Black x 20 HEP  Green x 20   Rows Black x 20 HEP  Green x 20   TB: Flexion, IR, ER, Adduction Black x 20 each  "HEP  IR/ER Green x 20 ea   BTE ROM T-60 60\" each T-66 90\" each     BTE Push/Pull T-126 60\" T-135 90\"      BTE H. Push/Pull T-126 60\" T-135 90\"      BTE IR/ER T-33 60\" T-36 90\"      Flexion to 90  Next  1#  2 x 10   Scaption to 90  Next  1# 2 x 10   Abduction to 90  Next  1# 2 x 10   Sidelying ER  2# 3 x 10     Serratus punches  4# 3 x 10            Ball scap stab on wall (alphabet)    2#  1 x   REcheck    35 minutes    HEP  Reviewed Reviewed tband ex Issued AAROM for HEP     OP EDUCATION:   Access Code: [email or web address]  URL: [email or web address]  Date: 11/15/2023  Prepared by: Mara Reyes    Exercises  - Seated Shoulder External Rotation PROM on Table  - 1-2 x daily - 7 x weekly - 15 reps  - Seated Shoulder Scaption Slide at Table Top with Forearm in Neutral  - 1-2 x daily - 7 x weekly - 15 reps  Goals: (as of 11/8/23)   Activity Limitation: Shoulder:  Will report being able to sleep on left side without waking due to pain; and report improved overhead reaching without pain., by week 6 - not met    Pain: Knee:Will report left knee pain no worse than 2/10 with prolonged walking/ standing; and daily activities.-met    Shoulder:  Left shoulder pain no worse than 2-3/10 with overhead reaching, overhead use, sleeping., by week 6 -not met    Range Of Motion/Joint Mobility: Left knee: painfree flexion improved to at least 120 deg.- met  Left shoulder: AROM WNL and no painful arc for improved overhead reaching., by week 6    -progressing    Strength: Improved valgus control with 8 in step down and completing at leas 15 reps without difficulty, and 30 second chair rise test within age adjusted norm of 12 or better for improved functional strength.- progressing    Shoulder: Left UE strength at least 4+/5 and painfree with MMT for improved funciton with overhead reaching; lifting, ADLs., by week 6 - not met    HEP, Independent and compliant with appropriate HEP for carryover of PT to meet all goals.  , by " week 6 -not met    Planned interventions include: education/instruction, home program, therapeutic activities and therapeutic exercises . Will focus on HEP development to complement current weekly workouts.   Frequency and duration: 1 time(s) a week, for 4 more weeks.  Potential to achieve rehab goals is good

## 2023-11-21 ENCOUNTER — DOCUMENTATION (OUTPATIENT)
Dept: PHYSICAL THERAPY | Facility: CLINIC | Age: 63
End: 2023-11-21
Payer: COMMERCIAL

## 2023-11-21 ENCOUNTER — APPOINTMENT (OUTPATIENT)
Dept: PHYSICAL THERAPY | Facility: CLINIC | Age: 63
End: 2023-11-21
Payer: COMMERCIAL

## 2023-11-21 NOTE — PROGRESS NOTES
Physical Therapy                 Therapy Communication Note    Patient Name: Juan Miguel Cristobal  MRN: [id number]  Today's Date: 11/21/2023     Discipline: Physical Therapy    Missed Visit Reason:      Patient: Canceled via MyChart (I must cancel my appointment for this week. )       Missed Time: Cancel    Comment:

## 2023-11-29 ENCOUNTER — TREATMENT (OUTPATIENT)
Dept: PHYSICAL THERAPY | Facility: CLINIC | Age: 63
End: 2023-11-29
Payer: COMMERCIAL

## 2023-11-29 DIAGNOSIS — M25.512 CHRONIC LEFT SHOULDER PAIN: Primary | ICD-10-CM

## 2023-11-29 DIAGNOSIS — G89.29 CHRONIC PAIN OF LEFT KNEE: ICD-10-CM

## 2023-11-29 DIAGNOSIS — M25.562 LEFT KNEE PAIN: ICD-10-CM

## 2023-11-29 DIAGNOSIS — M25.562 CHRONIC PAIN OF LEFT KNEE: ICD-10-CM

## 2023-11-29 DIAGNOSIS — M25.512 LEFT SHOULDER PAIN: ICD-10-CM

## 2023-11-29 DIAGNOSIS — G89.29 CHRONIC LEFT SHOULDER PAIN: Primary | ICD-10-CM

## 2023-11-29 PROCEDURE — 97110 THERAPEUTIC EXERCISES: CPT | Mod: GP

## 2023-11-29 ASSESSMENT — PAIN SCALES - GENERAL: PAINLEVEL_OUTOF10: 0 - NO PAIN

## 2023-11-29 ASSESSMENT — PAIN - FUNCTIONAL ASSESSMENT: PAIN_FUNCTIONAL_ASSESSMENT: 0-10

## 2023-11-29 NOTE — PROGRESS NOTES
Physical Therapy    Physical Therapy Treatment    Patient Name: Juan Miguel Cristobal  MRN: [id number]  Today's Date: 11/29/2023  Time Calculation  Start Time: 1108  Stop Time: 1150  Time Calculation (min): 42 min      Assessment:   Resisted tband ER is still somewhat uncomfortable. Modified this today to a resisted tband isometric and Juan Miguel was able to complete this without pain. Encouraged ex without pain; and was able to complete remaining ex without shoulder pain today.     Plan:   Continue subsymptom scap stabilizer and RTC strengthening and incorporate functional (diagnonal) patterns as tolerated. Try low grade bodyblade ex for stabilization and mm endurance in below shoulder level patterns. (IR/ER).    Current Problem  1. Chronic left shoulder pain        2. Left knee pain  Follow Up In Physical Therapy      3. Left shoulder pain  Follow Up In Physical Therapy      4. Chronic pain of left knee [M25.562, G89.29]          General     General  General Comment: Shoulder was sore after last session and for a few days. Is better now. Her  avoided shoulder work last week due to the soreness.    Subjective    Precautions  Precautions  Precautions Comment: None  Vital Signs     Pain  Pain Assessment  Pain Assessment: 0-10  Pain Score: 0 - No pain  Pain Location: Shoulder  Pain Orientation: Left    Objective      Treatments:  EXERCISES Date 11/15/23 Date: 11/29/23 Date:  Date:    NuStep              Shuttle DLP       Shuttle DTR       Shuttle SLP              Bilateral hamstring curls              Multi hip flexion        Multi hip abduction       Closed chain TKE              Front step up/down       30 sec chair rise test       Pulley AAROM flexion  3 minutes  3 minutes     Table ER/ Scaption AAROM 15 x 5 sec each  Pulley scaption 3 min     Pulldowns Green x 20 Green 2 x 20     Rows Green x 20 Green 2 x 20     TB: Flexion, IR, ER, Adduction IR/ER Green x 20 ea gr x 20 ea (add, IR)     Tband resisted  isometric  Gr  2 x 10 ext rot only            BTE ROM  (fwd/ back)  T60  60 sec ea     BTE Push/Pull  T 72  x 90 sec      BTE H. Push/Pull  T 90  x 90 sec     BTE IR/ER  ----            Flexion to 90 1#  2 x 10 1#  2 x 10     Scaption to 90 1# 2 x 10 1# 2 x 10     Abduction to 90 1# 2 x 10 1# 2 x 10     Sidelying ER       Serratus punches       Tband PNF D2 flexion  Yellow 2 x 10     Ball scap stab on wall (alphabet) 2#  1 x      REcheck       HEP Issued AAROM for HEP        OP EDUCATION:    Goals: (as of 11/8/23)   Activity Limitation: Shoulder:  Will report being able to sleep on left side without waking due to pain; and report improved overhead reaching without pain., by week 6 - not met    Pain: Knee:Will report left knee pain no worse than 2/10 with prolonged walking/ standing; and daily activities.-met    Shoulder:  Left shoulder pain no worse than 2-3/10 with overhead reaching, overhead use, sleeping., by week 6 -not met    Range Of Motion/Joint Mobility: Left knee: painfree flexion improved to at least 120 deg.- met  Left shoulder: AROM WNL and no painful arc for improved overhead reaching., by week 6    -progressing    Strength: Improved valgus control with 8 in step down and completing at leas 15 reps without difficulty, and 30 second chair rise test within age adjusted norm of 12 or better for improved functional strength.- progressing    Shoulder: Left UE strength at least 4+/5 and painfree with MMT for improved funciton with overhead reaching; lifting, ADLs., by week 6 - not met    HEP, Independent and compliant with appropriate HEP for carryover of PT to meet all goals.  , by week 6 -not met    Planned interventions include: education/instruction, home program, therapeutic activities and therapeutic exercises . Will focus on HEP development to complement current weekly workouts.   Frequency and duration: 1 time(s) a week, for 4 more weeks.  Potential to achieve rehab goals is good

## 2023-11-30 ENCOUNTER — TREATMENT (OUTPATIENT)
Dept: PHYSICAL THERAPY | Facility: CLINIC | Age: 63
End: 2023-11-30
Payer: COMMERCIAL

## 2023-11-30 DIAGNOSIS — M25.562 CHRONIC PAIN OF LEFT KNEE: ICD-10-CM

## 2023-11-30 DIAGNOSIS — M25.512 CHRONIC LEFT SHOULDER PAIN: Primary | ICD-10-CM

## 2023-11-30 DIAGNOSIS — G89.29 CHRONIC PAIN OF LEFT KNEE: ICD-10-CM

## 2023-11-30 DIAGNOSIS — G89.29 CHRONIC LEFT SHOULDER PAIN: Primary | ICD-10-CM

## 2023-11-30 PROCEDURE — 97110 THERAPEUTIC EXERCISES: CPT | Mod: GP

## 2023-11-30 ASSESSMENT — PAIN SCALES - GENERAL: PAINLEVEL_OUTOF10: 0 - NO PAIN

## 2023-11-30 ASSESSMENT — PAIN - FUNCTIONAL ASSESSMENT: PAIN_FUNCTIONAL_ASSESSMENT: 0-10

## 2023-11-30 NOTE — PROGRESS NOTES
Physical Therapy    Physical Therapy Treatment    Patient Name: Juan Miguel Cristobal  MRN: [id number]  Today's Date: 11/30/2023  Time Calculation  Start Time: 1150  Stop Time: 1232  Time Calculation (min): 42 min      Assessment:   Demonstrates good scap control with all shoulder level and above shoulder level exercises. Did not increase ex today due to back to back sessions, but reported no shoulder pain with ex today. Resisted ER isometric is painfree.     Plan:   Recheck next visit; try body blade and update Hep with tband diagnonals.  Assess for readiness for discharge to [facility].     Current Problem  1. Chronic left shoulder pain        2. Chronic pain of left knee            General  PT  Visit  Response to Previous Treatment: Patient with no complaints from previous session.  General  General Comment: Did not have as much soreness after yesterday's treatment. Worked with  yesterday afternoon and did not do much with her UEs.    Subjective    Precautions  Precautions  Precautions Comment: None  Vital Signs     Pain  Pain Assessment  Pain Assessment: 0-10  Pain Score: 0 - No pain  Pain Location: Shoulder  Pain Orientation: Left    Objective    Full painfree AAROM left shoulder      Treatments:  EXERCISES Date 11/15/23 Date: 11/29/23 Date: 11/30/23 Date:    NuStep              Shuttle DLP       Shuttle DTR       Shuttle SLP              Bilateral hamstring curls              Multi hip flexion        Multi hip abduction       Closed chain TKE              Front step up/down       30 sec chair rise test       Pulley AAROM flexion  3 minutes  3 minutes 3 min    Table ER/ Scaption AAROM 15 x 5 sec each  Pulley scaption 3 min 3 min    Pulldowns Green x 20 Green 2 x 20 Blue 2 x 15    Rows Green x 20 Green 2 x 20 Blue 2 x 15    TB: Flexion, IR, ER, Adduction IR/ER Green x 20 ea gr x 20 ea (add, IR) Gr (add, IR) x 20 ea    Tband resisted isometric  Gr  2 x 10 ext rot only Gr (ext rot only) x 20           BTE ROM   (fwd/ back)  T60  60 sec ea T60  x 60 sec ea    BTE Push/Pull  T 72  x 90 sec  T75      BTE H. Push/Pull  T 90  x 90 sec T90  x 90 sec    BTE IR/ER  ----            Flexion to 90 1#  2 x 10 1#  2 x 10 1# 2 x 10    Scaption to 90 1# 2 x 10 1# 2 x 10 1# 2 x 10    Abduction to 90 1# 2 x 10 1# 2 x 10 1#  2 x 10    Sidelying ER       Serratus punches       Tband PNF D2 flexion  Yellow 2 x 10 Yellow 2 x 10    Ball scap stab on wall (alphabet) 2#  1 x  2 x 20 cw/ ccw    REcheck       HEP Issued AAROM for HEP        OP EDUCATION:    Goals: (as of 11/8/23)   Activity Limitation: Shoulder:  Will report being able to sleep on left side without waking due to pain; and report improved overhead reaching without pain., by week 6 - not met    Pain: Knee:Will report left knee pain no worse than 2/10 with prolonged walking/ standing; and daily activities.-met    Shoulder:  Left shoulder pain no worse than 2-3/10 with overhead reaching, overhead use, sleeping., by week 6 -not met    Range Of Motion/Joint Mobility: Left knee: painfree flexion improved to at least 120 deg.- met  Left shoulder: AROM WNL and no painful arc for improved overhead reaching., by week 6    -progressing    Strength: Improved valgus control with 8 in step down and completing at leas 15 reps without difficulty, and 30 second chair rise test within age adjusted norm of 12 or better for improved functional strength.- progressing    Shoulder: Left UE strength at least 4+/5 and painfree with MMT for improved funciton with overhead reaching; lifting, ADLs., by week 6 - not met    HEP, Independent and compliant with appropriate HEP for carryover of PT to meet all goals.  , by week 6 -not met    Planned interventions include: education/instruction, home program, therapeutic activities and therapeutic exercises . Will focus on HEP development to complement current weekly workouts.   Frequency and duration: 1 time(s) a week, for 4 more weeks.  Potential to achieve rehab  goals is good

## 2023-12-06 ENCOUNTER — TREATMENT (OUTPATIENT)
Dept: PHYSICAL THERAPY | Facility: CLINIC | Age: 63
End: 2023-12-06
Payer: COMMERCIAL

## 2023-12-06 DIAGNOSIS — M25.562 LEFT KNEE PAIN: ICD-10-CM

## 2023-12-06 DIAGNOSIS — M25.512 CHRONIC LEFT SHOULDER PAIN: Primary | ICD-10-CM

## 2023-12-06 DIAGNOSIS — G89.29 CHRONIC LEFT SHOULDER PAIN: Primary | ICD-10-CM

## 2023-12-06 DIAGNOSIS — M25.512 LEFT SHOULDER PAIN: ICD-10-CM

## 2023-12-06 DIAGNOSIS — M25.562 CHRONIC PAIN OF LEFT KNEE: ICD-10-CM

## 2023-12-06 DIAGNOSIS — G89.29 CHRONIC PAIN OF LEFT KNEE: ICD-10-CM

## 2023-12-06 PROCEDURE — 97110 THERAPEUTIC EXERCISES: CPT | Mod: GP

## 2023-12-06 PROCEDURE — 97530 THERAPEUTIC ACTIVITIES: CPT | Mod: GP

## 2023-12-06 ASSESSMENT — PAIN - FUNCTIONAL ASSESSMENT: PAIN_FUNCTIONAL_ASSESSMENT: 0-10

## 2023-12-06 NOTE — PROGRESS NOTES
Physical Therapy    Physical Therapy Treatment/ Recheck    Patient Name: Juan Miguel Cristobal  MRN: [id number]  Today's Date: 12/6/2023  Time Calculation  Start Time: 1015  Stop Time: 1100  Time Calculation (min): 45 min  Visit # 11  Assessment:   Juan Miguel has attended 11 total PT visits to address her left shoulder and knee pain. Her knee goals were resolved as of last recheck and we are focusing primarily on her left shoulder RTC tendonitis. She has made some improvement in her painfree shoulder ROM, but still exhibits pain with left resisted ER and supraspinatus and pos special tests indicating RTC involvement. She will benefit from continued PT to work on subsymptom RTC strength for more consistent symptom relief with daily activities and improved strength/ function. We also discussed possibly setting up an appt with orthopaedics.  See objective data and goal status.     Plan:     Planned interventions include: education/instruction, home program, therapeutic activities and therapeutic exercises . Will focus on HEP development to complement current weekly workouts.   Frequency and duration: 1 time(s) a week, for 4 more weeks.  Potential to achieve rehab goals is good    Current Problem  1. Chronic left shoulder pain        2. Left knee pain  Follow Up In Physical Therapy      3. Left shoulder pain  Follow Up In Physical Therapy      4. Chronic pain of left knee            General  PT  Visit  Response to Previous Treatment: Patient with no complaints from previous session.  General  General Comment: Left shoulder does not hurt at rest; but it still gets sore/ painful if laying on that side. Reaching across her back is still difficult, vacuuming still hurts, driving is a little better. Noticing improvement with some things.    Subjective    Precautions  Precautions  Precautions Comment: None    Pain  Pain Assessment  Pain Assessment: 0-10  Pain Score:  (0-5/10  (worse with sleeping and some activities as mentioned  above))  Pain Location: Shoulder  Pain Orientation: Left    Outcome Measures:  Other Measures  Disability of Arm Shoulder Hand (DASH): 20 (20.45%)    Objective   Shoulder:   Palpation:  Left ant shoulder soreness in region of long head of bicep tendon; otherwise, non tender.      Shoulder AROM:  Flexion: Left: 145 ; painfree  Abduction: Left to 160 and painful arc above 140 deg  IR/ER: 90 deg ea.   Arm behind back to about T7 bilaterally, painfree  Arm behind head to T2 bilaterally, painfree  Reaching left arm across to opposite shoulder is painful     UE strength:   flex: 4+/ 5 left some discomfort; 4+/5 right  abd: 4/5 P! left; 4+/5 right  IR: 4+/5 left; 4+/5 right  ER: 4+/5 left and right   Supraspinatus:  4-/5 left and painful  .   Ortho Special Tests   Shoulder Special Tests:   Painful Arc: Left shoulder- improving   Belly Press Test: Left negative   Bear Hug Test: Left positive   Speed's: Left positive   Scarf Test - Horizontal Adduction Stress Test: Left positive    Treatments:  EXERCISES Date 11/15/23 Date: 11/29/23 Date: 11/30/23 Date: 12/6/23   Recheck for progress      15 min          Shuttle DLP       Shuttle DTR       Shuttle SLP              Bilateral hamstring curls              Multi hip flexion        Multi hip abduction       Closed chain TKE              Front step up/down       30 sec chair rise test       Pulley AAROM flexion  3 minutes  3 minutes 3 min  3 min   Table ER/ Scaption AAROM 15 x 5 sec each  Pulley scaption 3 min 3 min  3 min   Pulldowns Green x 20 Green 2 x 20 Blue 2 x 15 Blue 2 x 20   Rows Green x 20 Green 2 x 20 Blue 2 x 15 Blue 2 x 20    TB: Flexion, IR, ER, Adduction IR/ER Green x 20 ea gr x 20 ea (add, IR) Gr (add, IR) x 20 ea Green 2 x 15 ea   Tband resisted isometric  Gr  2 x 10 ext rot only Gr (ext rot only) x 20 Green 2 x 15          BTE ROM  (fwd/ back)  T60  60 sec ea T60  x 60 sec ea    BTE Push/Pull  T 72  x 90 sec  T75      BTE H. Push/Pull  T 90  x 90 sec T90  x 90  sec    BTE IR/ER  ----            Flexion to 90 1#  2 x 10 1#  2 x 10 1# 2 x 10 1# 2 x 10   Scaption to 90 1# 2 x 10 1# 2 x 10 1# 2 x 10 1# 2 x 10   Abduction to 90 1# 2 x 10 1# 2 x 10 1#  2 x 10 1# 2 x 10   Sidelying ER    ----->   Serratus punches       Tband PNF D2 flexion  Yellow 2 x 10 Yellow 2 x 10 ----->   Ball scap stab on wall (alphabet) 2#  1 x  2 x 20 cw/ ccw ------>   REcheck       HEP Issued AAROM for HEP        Goals: (as of 12/6/23)   Activity Limitation: Shoulder:  Will report being able to sleep on left side without waking due to pain; and report improved overhead reaching without pain., by week 6 - partially met    Pain: Knee:Will report left knee pain no worse than 2/10 with prolonged walking/ standing; and daily activities.-met- goal dc'd    Shoulder:  Left shoulder pain no worse than 2-3/10 with overhead reaching, overhead use, sleeping., by week 6 -partially met    Range Of Motion/Joint Mobility: Left knee: painfree flexion improved to at least 120 deg.- met- goal dc'd  Left shoulder: AROM WNL and no painful arc for improved overhead reaching., by week 6    -progressing    Strength: Improved valgus control with 8 in step down and completing at leas 15 reps without difficulty, and 30 second chair rise test within age adjusted norm of 12 or better for improved functional strength.- progressing    Shoulder: Left UE strength at least 4+/5 and painfree with MMT for improved funciton with overhead reaching; lifting, ADLs., by week 6 - progressing    HEP, Independent and compliant with appropriate HEP for carryover of PT to meet all goals.  , by week 6 -ongoing

## 2023-12-12 ENCOUNTER — TREATMENT (OUTPATIENT)
Dept: PHYSICAL THERAPY | Facility: CLINIC | Age: 63
End: 2023-12-12
Payer: COMMERCIAL

## 2023-12-12 DIAGNOSIS — M25.562 LEFT KNEE PAIN: ICD-10-CM

## 2023-12-12 DIAGNOSIS — M25.512 LEFT SHOULDER PAIN: Primary | ICD-10-CM

## 2023-12-12 PROCEDURE — 97110 THERAPEUTIC EXERCISES: CPT | Mod: GP,CQ

## 2023-12-12 ASSESSMENT — PAIN SCALES - GENERAL: PAINLEVEL_OUTOF10: 0 - NO PAIN

## 2023-12-12 ASSESSMENT — PAIN - FUNCTIONAL ASSESSMENT: PAIN_FUNCTIONAL_ASSESSMENT: 0-10

## 2023-12-12 NOTE — PROGRESS NOTES
"Physical Therapy    Physical Therapy Treatment    Patient Name: Juan Miguel Cristobal  MRN: [id number]  :1960  Today's Date: 2023  Time Calculation  Start Time: 1445  Stop Time: 1530  Time Calculation (min): 45 min    Visit: 12  Visit limit: 30    Assessment:   Patient performed exercises without complaints of increased left shoulder or upper extremity symptoms. Patient indicates is able to use left upper extremity with more lifting and reaching activities with decreased pain.    Plan:   Continue with working to improve range of motion and strength of left shoulder and upper extremity progressing as tolerated to return patient to prior functional status.     Patient RTD as needed.     Current Problem  1. Left shoulder pain  Follow Up In Physical Therapy      2. Left knee pain  Follow Up In Physical Therapy          Subjective    Patient reports overall decreases in left shoulder pain with daily and work activities. Patient indicates continues to experience left shoulder and upper extremity discomfort with laying on left side.     Precautions  Precautions  Precautions Comment: None    Pain  Pain Assessment  Pain Assessment: 0-10  Pain Score: 0 - No pain  Pain Location: Shoulder  Pain Orientation: Left    Outcome Measures:  Other Measures  Disability of Arm Shoulder Hand (DASH): 20 (20.45%) (Score as of 2023.)    Objective   Added and increased exercises. See exercise log for specifics.     AROM left shoulder  WFL in all ranges without complaints of painful arc.     Treatments:  EXERCISES Date 2023 Date: Date:  Date:    Pulley flexion 4 minutes       Pulley scaption 4 minutes      Pulley internal rotation 20x             BTE ROM T-63 90\" each      BTE Push/Pull T-132 90\"      BTE H. Push/Pull T-132 90\"      BTE IR/ER T-48 90\"             Flexion to 90  2# x 20      Scaption to 90 2# x 20      Abduction to 90 2# x 20      Sidelying ER 2# 3 x 15      Serratus punches 4# 3 x 15             Ball on wall " A->Z 2# x 2      Pulldowns Black x 20   Blue 2 x 20   Rows Black x 20   Blue 2 x 20    Tband flexion Black x 20   Green 2 x 15 ea   Tband adduction Black x 20   Green 2 x 15   Tband internal rotation Black x 20      Tband external rotation Black x 20                                                                                          HEP         Goals: (as of 12/6/23)   Activity Limitation: Shoulder:  Will report being able to sleep on left side without waking due to pain; and report improved overhead reaching without pain., by week 6 - partially met    Pain: Knee:Will report left knee pain no worse than 2/10 with prolonged walking/ standing; and daily activities.-met- goal dc'd    Shoulder:  Left shoulder pain no worse than 2-3/10 with overhead reaching, overhead use, sleeping., by week 6 -partially met    Range Of Motion/Joint Mobility: Left knee: painfree flexion improved to at least 120 deg.- met- goal dc'd  Left shoulder: AROM WNL and no painful arc for improved overhead reaching., by week 6    -12/12/2023 progressing    Strength: Improved valgus control with 8 in step down and completing at leas 15 reps without difficulty, and 30 second chair rise test within age adjusted norm of 12 or better for improved functional strength.- progressing    Shoulder: Left UE strength at least 4+/5 and painfree with MMT for improved funciton with overhead reaching; lifting, ADLs., by week 6 - progressing    HEP, Independent and compliant with appropriate HEP for carryover of PT to meet all goals.  , by week 6 -ongoing

## 2023-12-20 ENCOUNTER — TREATMENT (OUTPATIENT)
Dept: PHYSICAL THERAPY | Facility: CLINIC | Age: 63
End: 2023-12-20
Payer: COMMERCIAL

## 2023-12-20 DIAGNOSIS — G89.29 CHRONIC LEFT SHOULDER PAIN: ICD-10-CM

## 2023-12-20 DIAGNOSIS — R29.898 WEAKNESS OF SHOULDER: ICD-10-CM

## 2023-12-20 DIAGNOSIS — M25.612 DECREASED RANGE OF MOTION OF LEFT SHOULDER: Primary | ICD-10-CM

## 2023-12-20 DIAGNOSIS — M25.512 CHRONIC LEFT SHOULDER PAIN: ICD-10-CM

## 2023-12-20 PROCEDURE — 97110 THERAPEUTIC EXERCISES: CPT | Mod: GP,CQ

## 2023-12-20 ASSESSMENT — PAIN DESCRIPTION - DESCRIPTORS: DESCRIPTORS: ACHING;TIGHTNESS

## 2023-12-20 ASSESSMENT — PAIN - FUNCTIONAL ASSESSMENT: PAIN_FUNCTIONAL_ASSESSMENT: 0-10

## 2023-12-20 NOTE — PROGRESS NOTES
Physical Therapy    Physical Therapy Treatment    Patient Name: Juan Miguel Cristobla  MRN: [id number]  :1960  Today's Date: 2023  Time Calculation  Start Time: 930  Stop Time:   Time Calculation (min): 45 min    Visit: 13  Visit limit: 30    Assessment:   Patient performed exercises without complaints of increased left shoulder or upper extremity symptoms. Patient presented with increases in left shoulder strength measures since last recorded measurements. Patient also indicates overall decreases in left shoulder pain with daily and work activities. Patient however continues to experience discomfort and left upper extremity weakness with repetitive reaching and lifting activities compared to right upper extremity.     Plan:   Continue with working to improve range of motion and strength of left shoulder and upper extremity progressing as tolerated to return patient to prior functional status.     Patient scheduled for a reassess on 2024.  Patient RTD as needed.     Current Problem  1. Decreased range of motion of left shoulder        2. Chronic left shoulder pain  Referral to Physical Therapy      3. Weakness of shoulder            Subjective    Patient reports left shoulder is a little sore today and feels it is from laying on left side last night. Patient indicates range of motion continues to improve with decreased pain with active movements.     Precautions  Precautions  Precautions Comment: None    Pain  Pain Assessment  Pain Assessment: 0-10  Pain Score:  (0-1/10)  Pain Location: Shoulder  Pain Orientation: Left  Pain Descriptors: Aching, Tightness    Outcome Measures:  Other Measures  Disability of Arm Shoulder Hand (DASH): 20 (20.45%) (Score as of 2023)    Objective   Left shoulder strength  Flexion  = 5/5  Abduction = 4+/5*  Scaption = 4+/5  Internal rotation = 5/5  External rotation = 4+/5    *denotes mild discomfort against resistance      Treatments:  EXERCISES Date 2023  "Date:   12/20/2023 Date:  Date:    Pulley flexion 4 minutes 4 minutes      Pulley scaption 4 minutes 4 minutes     Pulley internal rotation 20x 20x            BTE ROM T-63 90\" each T-72 100\" each     BTE Push/Pull T-132 90\" T-144 100\"     BTE H. Push/Pull T-132 90\" T-144 100\"     BTE IR/ER T-48 90\" T-48 100\"            Flexion to 90  2# x 20 2# x 20     Scaption to 90 2# x 20 2# x 20     Abduction to 90 2# x 20 2# x 20     Sidelying ER 2# 3 x 15 2# 3 x 15     Serratus punches 4# 3 x 15 4# 3 x 15            Ball on wall A->Z 2# x 2 2# x 2     Pulldowns Black x 20 Black x 20     Rows Black x 20 Black x 20     Tband flexion Black x 20 Black x 20     Tband adduction Black x 20 Black x 20     Tband internal rotation Black x 20 Black x 20     Tband external rotation Black x 20 Black x 20                                                                                         HEP         Goals: (as of 12/6/23)   Activity Limitation: Shoulder:  Will report being able to sleep on left side without waking due to pain; and report improved overhead reaching without pain., by week 6 - partially met    Pain: Knee:Will report left knee pain no worse than 2/10 with prolonged walking/ standing; and daily activities.-met- goal dc'd    Shoulder:  Left shoulder pain no worse than 2-3/10 with overhead reaching, overhead use, sleeping., by week 6 -partially met    Range Of Motion/Joint Mobility: Left knee: painfree flexion improved to at least 120 deg.- met- goal dc'd  Left shoulder: AROM WNL and no painful arc for improved overhead reaching., by week 6    -12/12/2023 progressing    Strength: Improved valgus control with 8 in step down and completing at leas 15 reps without difficulty, and 30 second chair rise test within age adjusted norm of 12 or better for improved functional strength.- progressing    Shoulder: Left UE strength at least 4+/5 and painfree with MMT for improved funciton with overhead reaching; lifting, ADLs., by week 6 - "   12/20/2023 progressing    HEP, Independent and compliant with appropriate HEP for carryover of PT to meet all goals.  , by week 6 -ongoing

## 2023-12-28 ENCOUNTER — TREATMENT (OUTPATIENT)
Dept: PHYSICAL THERAPY | Facility: CLINIC | Age: 63
End: 2023-12-28
Payer: COMMERCIAL

## 2023-12-28 DIAGNOSIS — M25.512 CHRONIC LEFT SHOULDER PAIN: Primary | ICD-10-CM

## 2023-12-28 DIAGNOSIS — M25.562 LEFT KNEE PAIN: ICD-10-CM

## 2023-12-28 DIAGNOSIS — M25.562 CHRONIC PAIN OF LEFT KNEE: ICD-10-CM

## 2023-12-28 DIAGNOSIS — G89.29 CHRONIC LEFT SHOULDER PAIN: Primary | ICD-10-CM

## 2023-12-28 DIAGNOSIS — M25.512 LEFT SHOULDER PAIN: ICD-10-CM

## 2023-12-28 DIAGNOSIS — G89.29 CHRONIC PAIN OF LEFT KNEE: ICD-10-CM

## 2023-12-28 PROCEDURE — 97110 THERAPEUTIC EXERCISES: CPT | Mod: GP

## 2023-12-28 ASSESSMENT — PAIN - FUNCTIONAL ASSESSMENT: PAIN_FUNCTIONAL_ASSESSMENT: 0-10

## 2023-12-28 ASSESSMENT — PAIN SCALES - GENERAL: PAINLEVEL_OUTOF10: 0 - NO PAIN

## 2023-12-28 NOTE — PROGRESS NOTES
"Physical Therapy    Physical Therapy Treatment    Patient Name: Juan Miguel Cristobal  MRN: [id number]  Today's Date: 12/28/2023  Time Calculation  Start Time: 1405  Stop Time: 1445  Time Calculation (min): 40 min    Assessment:    Shoulder AROM is WFL; but still  has some posterior shoulder discomfort with reaching across her body; and with reaching overhead. Some discomfort with return from D2 flexion pattern. Weakness of shoulder external rotators especially. However, is tolerating gradual progression of strengthening ex for scap stabilizers and RTC mm. We did discuss reaching out to her Dr to discuss possible orthopaedic referral for lingering pain in upper shoulder ranges and RTC ; posterior cuff weakness.     Plan:   Continue x 2 more visits, update HEP with strengthening ex.    Current Problem  1. Chronic left shoulder pain        2. Left knee pain  PT eval and treat      3. Left shoulder pain  PT eval and treat      4. Chronic pain of left knee            General  PT  Visit  Response to Previous Treatment: Patient with no complaints from previous session.  General  General Comment: Still has nagging shoulder discomfort with certain movements; but no pain at rest. Reaching overhead can provoke some left shoulder pain at work.    Subjective    Precautions  Precautions  Precautions Comment: None  Pain  Pain Assessment  Pain Assessment: 0-10  Pain Score: 0 - No pain (sore with certain motions still)    Objective      Treatments:  Treatments:  EXERCISES Date 12/12/2023 Date:   12/20/2023 Date: 12/28/23 Date:    Pulley flexion 4 minutes 4 minutes      Pulley scaption 4 minutes 4 minutes     Pulley internal rotation 20x 20x            BTE ROM T-63 90\" each T-72 100\" each  T 75 x 90 sec ea    BTE Push/Pull T-132 90\" T-144 100\"  T 144 x 90 sec    BTE H. Push/Pull T-132 90\" T-144 100\"  T 144 x 90 sec    BTE IR/ER T-48 90\" T-48 100\"  T 48 x            Flexion to 90  2# x 20 2# x 20 2# x 20    Scaption to 90 2# x 20 2# x 20 2# " x 20    Abduction to 90 2# x 20 2# x 20 2# x 20    Sidelying ER 2# 3 x 15 2# 3 x 15 3#  3 x 10    Serratus punches 4# 3 x 15 4# 3 x 15            Ball on wall A->Z 2# x 2 2# x 2     Pulldowns Black x 20 Black x 20 Black 2 x 20    Rows Black x 20 Black x 20 Black 2 x 20    Tband flexion Black x 20 Black x 20     Tband adduction Black x 20 Black x 20     Tband internal rotation Black x 20 Black x 20 Black x 20    Tband external rotation Black x 20 Black x 20 Black x 20           Supine PNF D2 flexion    Orange 2 x 10                                              HEP         Goals: (as of 12/6/23)   Activity Limitation: Shoulder:  Will report being able to sleep on left side without waking due to pain; and report improved overhead reaching without pain., by week 6 - partially met    Pain: Knee:Will report left knee pain no worse than 2/10 with prolonged walking/ standing; and daily activities.-met- goal dc'd    Shoulder:  Left shoulder pain no worse than 2-3/10 with overhead reaching, overhead use, sleeping., by week 6 -partially met    Range Of Motion/Joint Mobility: Left knee: painfree flexion improved to at least 120 deg.- met- goal dc'd  Left shoulder: AROM WNL and no painful arc for improved overhead reaching., by week 6    -12/12/2023 progressing    Strength: Improved valgus control with 8 in step down and completing at leas 15 reps without difficulty, and 30 second chair rise test within age adjusted norm of 12 or better for improved functional strength.- progressing    Shoulder: Left UE strength at least 4+/5 and painfree with MMT for improved funciton with overhead reaching; lifting, ADLs., by week 6 -   12/20/2023 progressing    HEP, Independent and compliant with appropriate HEP for carryover of PT to meet all goals.  , by week 6 -ongoing

## 2024-01-04 ENCOUNTER — APPOINTMENT (OUTPATIENT)
Dept: PHYSICAL THERAPY | Facility: CLINIC | Age: 64
End: 2024-01-04
Payer: COMMERCIAL

## 2024-01-09 ENCOUNTER — APPOINTMENT (OUTPATIENT)
Dept: PHYSICAL THERAPY | Facility: CLINIC | Age: 64
End: 2024-01-09
Payer: COMMERCIAL

## 2024-03-21 ENCOUNTER — APPOINTMENT (OUTPATIENT)
Dept: PRIMARY CARE | Facility: CLINIC | Age: 64
End: 2024-03-21
Payer: COMMERCIAL

## 2024-06-25 ENCOUNTER — APPOINTMENT (OUTPATIENT)
Dept: PRIMARY CARE | Facility: CLINIC | Age: 64
End: 2024-06-25
Payer: COMMERCIAL

## 2025-05-30 ENCOUNTER — HOSPITAL ENCOUNTER (OUTPATIENT)
Dept: RADIOLOGY | Facility: HOSPITAL | Age: 65
Discharge: HOME | End: 2025-05-30
Payer: COMMERCIAL

## 2025-05-30 VITALS — HEIGHT: 60 IN | BODY MASS INDEX: 26.7 KG/M2 | WEIGHT: 136 LBS

## 2025-05-30 DIAGNOSIS — Z12.31 SCREENING MAMMOGRAM FOR BREAST CANCER: ICD-10-CM

## 2025-05-30 PROCEDURE — 77067 SCR MAMMO BI INCL CAD: CPT

## 2025-05-30 PROCEDURE — 77063 BREAST TOMOSYNTHESIS BI: CPT

## 2025-08-12 ENCOUNTER — HOSPITAL ENCOUNTER (OUTPATIENT)
Dept: RADIOLOGY | Facility: CLINIC | Age: 65
Discharge: HOME | End: 2025-08-12
Payer: COMMERCIAL

## 2025-08-12 ENCOUNTER — OFFICE VISIT (OUTPATIENT)
Dept: PRIMARY CARE | Facility: CLINIC | Age: 65
End: 2025-08-12
Payer: COMMERCIAL

## 2025-08-12 VITALS
HEART RATE: 81 BPM | DIASTOLIC BLOOD PRESSURE: 83 MMHG | OXYGEN SATURATION: 99 % | WEIGHT: 147 LBS | SYSTOLIC BLOOD PRESSURE: 150 MMHG | BODY MASS INDEX: 28.71 KG/M2

## 2025-08-12 DIAGNOSIS — M79.674 GREAT TOE PAIN, RIGHT: ICD-10-CM

## 2025-08-12 DIAGNOSIS — Z23 NEED FOR PNEUMOCOCCAL VACCINE: ICD-10-CM

## 2025-08-12 DIAGNOSIS — Z00.00 ENCOUNTER FOR PREVENTATIVE ADULT HEALTH CARE EXAMINATION: Primary | ICD-10-CM

## 2025-08-12 DIAGNOSIS — Z98.84 STATUS POST GASTRIC BYPASS FOR OBESITY: ICD-10-CM

## 2025-08-12 DIAGNOSIS — E78.5 HYPERLIPIDEMIA, UNSPECIFIED HYPERLIPIDEMIA TYPE: ICD-10-CM

## 2025-08-12 DIAGNOSIS — R03.0 ELEVATED BLOOD PRESSURE READING: ICD-10-CM

## 2025-08-12 DIAGNOSIS — Z78.0 ASYMPTOMATIC MENOPAUSAL STATE: ICD-10-CM

## 2025-08-12 PROCEDURE — 3077F SYST BP >= 140 MM HG: CPT | Performed by: INTERNAL MEDICINE

## 2025-08-12 PROCEDURE — 1159F MED LIST DOCD IN RCRD: CPT | Performed by: INTERNAL MEDICINE

## 2025-08-12 PROCEDURE — 1036F TOBACCO NON-USER: CPT | Performed by: INTERNAL MEDICINE

## 2025-08-12 PROCEDURE — 99214 OFFICE O/P EST MOD 30 MIN: CPT | Performed by: INTERNAL MEDICINE

## 2025-08-12 PROCEDURE — 90471 IMMUNIZATION ADMIN: CPT | Performed by: INTERNAL MEDICINE

## 2025-08-12 PROCEDURE — 1125F AMNT PAIN NOTED PAIN PRSNT: CPT | Performed by: INTERNAL MEDICINE

## 2025-08-12 PROCEDURE — 3079F DIAST BP 80-89 MM HG: CPT | Performed by: INTERNAL MEDICINE

## 2025-08-12 PROCEDURE — 1160F RVW MEDS BY RX/DR IN RCRD: CPT | Performed by: INTERNAL MEDICINE

## 2025-08-12 PROCEDURE — 73660 X-RAY EXAM OF TOE(S): CPT | Mod: RIGHT SIDE | Performed by: RADIOLOGY

## 2025-08-12 PROCEDURE — 73660 X-RAY EXAM OF TOE(S): CPT | Mod: RT

## 2025-08-12 PROCEDURE — 90677 PCV20 VACCINE IM: CPT | Performed by: INTERNAL MEDICINE

## 2025-08-12 RX ORDER — ATORVASTATIN CALCIUM 20 MG/1
20 TABLET, FILM COATED ORAL DAILY
Qty: 90 TABLET | Refills: 0 | Status: SHIPPED | OUTPATIENT
Start: 2025-08-12

## 2025-08-12 ASSESSMENT — PAIN SCALES - GENERAL: PAINLEVEL_OUTOF10: 3

## 2025-08-16 LAB
25(OH)D3+25(OH)D2 SERPL-MCNC: 57 NG/ML (ref 30–100)
ALBUMIN SERPL-MCNC: 4.8 G/DL (ref 3.6–5.1)
ALBUMIN/CREAT UR: 4 MG/G CREAT
ALP SERPL-CCNC: 51 U/L (ref 37–153)
ALT SERPL-CCNC: 14 U/L (ref 6–29)
ANION GAP SERPL CALCULATED.4IONS-SCNC: 10 MMOL/L (CALC) (ref 7–17)
AST SERPL-CCNC: 21 U/L (ref 10–35)
BASOPHILS # BLD AUTO: 39 CELLS/UL (ref 0–200)
BASOPHILS NFR BLD AUTO: 0.8 %
BILIRUB SERPL-MCNC: 0.6 MG/DL (ref 0.2–1.2)
BUN SERPL-MCNC: 16 MG/DL (ref 7–25)
CALCIUM SERPL-MCNC: 9.5 MG/DL (ref 8.6–10.4)
CHLORIDE SERPL-SCNC: 105 MMOL/L (ref 98–110)
CHOLEST SERPL-MCNC: 208 MG/DL
CHOLEST/HDLC SERPL: 2.5 (CALC)
CO2 SERPL-SCNC: 28 MMOL/L (ref 20–32)
COPPER BLD-MCNC: 120 MCG/DL
CREAT SERPL-MCNC: 0.91 MG/DL (ref 0.5–1.05)
CREAT UR-MCNC: 45 MG/DL (ref 20–275)
EGFRCR SERPLBLD CKD-EPI 2021: 70 ML/MIN/1.73M2
EOSINOPHIL # BLD AUTO: 88 CELLS/UL (ref 15–500)
EOSINOPHIL NFR BLD AUTO: 1.8 %
ERYTHROCYTE [DISTWIDTH] IN BLOOD BY AUTOMATED COUNT: 14.2 % (ref 11–15)
EST. AVERAGE GLUCOSE BLD GHB EST-MCNC: 100 MG/DL
EST. AVERAGE GLUCOSE BLD GHB EST-SCNC: 5.5 MMOL/L
FERRITIN SERPL-MCNC: 96 NG/ML (ref 16–288)
FOLATE SERPL-MCNC: >24 NG/ML
GLUCOSE SERPL-MCNC: 90 MG/DL (ref 65–99)
HBA1C MFR BLD: 5.1 %
HCT VFR BLD AUTO: 41.4 % (ref 35–45)
HDLC SERPL-MCNC: 84 MG/DL
HGB BLD-MCNC: 12.5 G/DL (ref 11.7–15.5)
IRON SATN MFR SERPL: 26 % (CALC) (ref 16–45)
IRON SERPL-MCNC: 84 MCG/DL (ref 45–160)
LDLC SERPL CALC-MCNC: 108 MG/DL (CALC)
LYMPHOCYTES # BLD AUTO: 2166 CELLS/UL (ref 850–3900)
LYMPHOCYTES NFR BLD AUTO: 44.2 %
MCH RBC QN AUTO: 25 PG (ref 27–33)
MCHC RBC AUTO-ENTMCNC: 30.2 G/DL (ref 32–36)
MCV RBC AUTO: 82.6 FL (ref 80–100)
MICROALBUMIN UR-MCNC: 0.2 MG/DL
MONOCYTES # BLD AUTO: 319 CELLS/UL (ref 200–950)
MONOCYTES NFR BLD AUTO: 6.5 %
NEUTROPHILS # BLD AUTO: 2288 CELLS/UL (ref 1500–7800)
NEUTROPHILS NFR BLD AUTO: 46.7 %
NONHDLC SERPL-MCNC: 124 MG/DL (CALC)
PLATELET # BLD AUTO: 200 THOUSAND/UL (ref 140–400)
PMV BLD REES-ECKER: 12.7 FL (ref 7.5–12.5)
POTASSIUM SERPL-SCNC: 4.5 MMOL/L (ref 3.5–5.3)
PROT SERPL-MCNC: 7 G/DL (ref 6.1–8.1)
RBC # BLD AUTO: 5.01 MILLION/UL (ref 3.8–5.1)
SELENIUM SERPL-MCNC: 139 MCG/L (ref 63–160)
SODIUM SERPL-SCNC: 143 MMOL/L (ref 135–146)
TIBC SERPL-MCNC: 323 MCG/DL (CALC) (ref 250–450)
TRIGL SERPL-MCNC: 69 MG/DL
TSH SERPL-ACNC: 0.56 MIU/L (ref 0.4–4.5)
VIT A SERPL-MCNC: 69 MCG/DL (ref 38–98)
VIT B12 SERPL-MCNC: 953 PG/ML (ref 200–1100)
VIT C SERPL-MCNC: 0.7 MG/DL (ref 0.3–2.7)
WBC # BLD AUTO: 4.9 THOUSAND/UL (ref 3.8–10.8)
ZINC SERPL-MCNC: 63 MCG/DL (ref 60–130)

## 2025-08-19 ENCOUNTER — HOSPITAL ENCOUNTER (OUTPATIENT)
Dept: RADIOLOGY | Facility: CLINIC | Age: 65
Discharge: HOME | End: 2025-08-19
Payer: COMMERCIAL

## 2025-08-19 DIAGNOSIS — Z78.0 ASYMPTOMATIC MENOPAUSAL STATE: ICD-10-CM

## 2025-08-19 PROCEDURE — 77080 DXA BONE DENSITY AXIAL: CPT | Performed by: RADIOLOGY

## 2025-08-19 PROCEDURE — 77080 DXA BONE DENSITY AXIAL: CPT

## 2025-08-20 ENCOUNTER — APPOINTMENT (OUTPATIENT)
Dept: PRIMARY CARE | Facility: CLINIC | Age: 65
End: 2025-08-20
Payer: COMMERCIAL

## 2025-08-20 VITALS
BODY MASS INDEX: 28.71 KG/M2 | DIASTOLIC BLOOD PRESSURE: 78 MMHG | SYSTOLIC BLOOD PRESSURE: 142 MMHG | TEMPERATURE: 97.7 F | WEIGHT: 147 LBS | HEART RATE: 90 BPM

## 2025-08-20 DIAGNOSIS — R41.3 MEMORY LOSS: ICD-10-CM

## 2025-08-20 DIAGNOSIS — Z00.00 ENCOUNTER FOR ROUTINE ADULT HEALTH EXAMINATION WITHOUT ABNORMAL FINDINGS: ICD-10-CM

## 2025-08-20 DIAGNOSIS — G43.009 MIGRAINE WITHOUT AURA AND WITHOUT STATUS MIGRAINOSUS, NOT INTRACTABLE: ICD-10-CM

## 2025-08-20 DIAGNOSIS — I10 HYPERTENSION, UNSPECIFIED TYPE: Primary | ICD-10-CM

## 2025-08-20 DIAGNOSIS — F32.0 CURRENT MILD EPISODE OF MAJOR DEPRESSIVE DISORDER WITHOUT PRIOR EPISODE: ICD-10-CM

## 2025-08-20 DIAGNOSIS — E78.5 HYPERLIPIDEMIA, UNSPECIFIED HYPERLIPIDEMIA TYPE: ICD-10-CM

## 2025-08-20 DIAGNOSIS — Z23 IMMUNIZATION DUE: ICD-10-CM

## 2025-08-20 PROBLEM — M25.612 DECREASED RANGE OF MOTION OF LEFT SHOULDER: Status: RESOLVED | Noted: 2023-12-20 | Resolved: 2025-08-20

## 2025-08-20 PROBLEM — R07.89 ATYPICAL CHEST PAIN: Status: RESOLVED | Noted: 2023-09-30 | Resolved: 2025-08-20

## 2025-08-20 PROBLEM — D64.9 ANEMIA: Status: RESOLVED | Noted: 2018-12-14 | Resolved: 2025-08-20

## 2025-08-20 PROBLEM — E55.9 VITAMIN D DEFICIENCY: Status: RESOLVED | Noted: 2023-06-13 | Resolved: 2025-08-20

## 2025-08-20 PROBLEM — R29.898 WEAKNESS OF SHOULDER: Status: RESOLVED | Noted: 2023-12-20 | Resolved: 2025-08-20

## 2025-08-20 PROBLEM — M25.562 LEFT KNEE PAIN: Status: RESOLVED | Noted: 2023-10-25 | Resolved: 2025-08-20

## 2025-08-20 PROBLEM — R06.02 SOBOE (SHORTNESS OF BREATH ON EXERTION): Status: RESOLVED | Noted: 2018-12-14 | Resolved: 2025-08-20

## 2025-08-20 PROBLEM — L29.9 ITCHING: Status: RESOLVED | Noted: 2023-09-30 | Resolved: 2025-08-20

## 2025-08-20 PROBLEM — M25.512 LEFT SHOULDER PAIN: Status: RESOLVED | Noted: 2023-10-25 | Resolved: 2025-08-20

## 2025-08-20 PROBLEM — M17.12 LOCALIZED OSTEOARTHRITIS OF LEFT KNEE: Status: RESOLVED | Noted: 2023-09-21 | Resolved: 2025-08-20

## 2025-08-20 PROBLEM — R53.83 FATIGUE: Status: RESOLVED | Noted: 2018-12-14 | Resolved: 2025-08-20

## 2025-08-20 PROCEDURE — 1160F RVW MEDS BY RX/DR IN RCRD: CPT | Performed by: INTERNAL MEDICINE

## 2025-08-20 PROCEDURE — 3078F DIAST BP <80 MM HG: CPT | Performed by: INTERNAL MEDICINE

## 2025-08-20 PROCEDURE — 90750 HZV VACC RECOMBINANT IM: CPT | Performed by: INTERNAL MEDICINE

## 2025-08-20 PROCEDURE — 99214 OFFICE O/P EST MOD 30 MIN: CPT | Performed by: INTERNAL MEDICINE

## 2025-08-20 PROCEDURE — 3077F SYST BP >= 140 MM HG: CPT | Performed by: INTERNAL MEDICINE

## 2025-08-20 PROCEDURE — 1159F MED LIST DOCD IN RCRD: CPT | Performed by: INTERNAL MEDICINE

## 2025-08-20 PROCEDURE — 90471 IMMUNIZATION ADMIN: CPT | Performed by: INTERNAL MEDICINE

## 2025-08-20 PROCEDURE — 99397 PER PM REEVAL EST PAT 65+ YR: CPT | Performed by: INTERNAL MEDICINE

## 2025-08-20 PROCEDURE — 1036F TOBACCO NON-USER: CPT | Performed by: INTERNAL MEDICINE

## 2025-08-20 RX ORDER — DULOXETIN HYDROCHLORIDE 20 MG/1
20 CAPSULE, DELAYED RELEASE ORAL DAILY
Qty: 30 CAPSULE | Refills: 0 | Status: SHIPPED | OUTPATIENT
Start: 2025-08-20 | End: 2025-09-19

## 2025-08-20 ASSESSMENT — PATIENT HEALTH QUESTIONNAIRE - PHQ9
SUM OF ALL RESPONSES TO PHQ9 QUESTIONS 1 & 2: 1
1. LITTLE INTEREST OR PLEASURE IN DOING THINGS: NOT AT ALL
6. FEELING BAD ABOUT YOURSELF - OR THAT YOU ARE A FAILURE OR HAVE LET YOURSELF OR YOUR FAMILY DOWN: SEVERAL DAYS
8. MOVING OR SPEAKING SO SLOWLY THAT OTHER PEOPLE COULD HAVE NOTICED. OR THE OPPOSITE, BEING SO FIGETY OR RESTLESS THAT YOU HAVE BEEN MOVING AROUND A LOT MORE THAN USUAL: NOT AT ALL
2. FEELING DOWN, DEPRESSED OR HOPELESS: SEVERAL DAYS
3. TROUBLE FALLING OR STAYING ASLEEP: NEARLY EVERY DAY
SUM OF ALL RESPONSES TO PHQ QUESTIONS 1-9: 9
7. TROUBLE CONCENTRATING ON THINGS, SUCH AS READING THE NEWSPAPER OR WATCHING TELEVISION: SEVERAL DAYS
4. FEELING TIRED OR HAVING LITTLE ENERGY: NEARLY EVERY DAY
9. THOUGHTS THAT YOU WOULD BE BETTER OFF DEAD, OR OF HURTING YOURSELF: NOT AT ALL
5. POOR APPETITE OR OVEREATING: NOT AT ALL

## 2025-11-13 ENCOUNTER — APPOINTMENT (OUTPATIENT)
Dept: PRIMARY CARE | Facility: CLINIC | Age: 65
End: 2025-11-13
Payer: COMMERCIAL

## 2025-11-25 ENCOUNTER — APPOINTMENT (OUTPATIENT)
Dept: PRIMARY CARE | Facility: CLINIC | Age: 65
End: 2025-11-25
Payer: COMMERCIAL